# Patient Record
Sex: FEMALE | Race: WHITE | Employment: FULL TIME | URBAN - METROPOLITAN AREA
[De-identification: names, ages, dates, MRNs, and addresses within clinical notes are randomized per-mention and may not be internally consistent; named-entity substitution may affect disease eponyms.]

---

## 2021-09-10 ENCOUNTER — TELEPHONE (OUTPATIENT)
Dept: BARIATRICS/WEIGHT MGMT | Age: 51
End: 2021-09-10

## 2021-09-10 NOTE — TELEPHONE ENCOUNTER
Patient was sent Dr. Brian Yo digital bariatric seminar. Patient DOES NOT have BWLS coverage with [employer] (Plan Exclusion)     Bariatric Benefits scanned in media. *Spoke with pt. Info given. She would like to proceed as SELF PAY at this time. She will be on her 's insurance Jan 2022, in hopes to have surgical coverage. SELF PAY info explained & e-mailed. Appt sched. Np pk emailed. Per pt No HX of WLS.  BMI > 35

## 2021-11-03 ENCOUNTER — OFFICE VISIT (OUTPATIENT)
Dept: BARIATRICS/WEIGHT MGMT | Age: 51
End: 2021-11-03

## 2021-11-03 VITALS
BODY MASS INDEX: 44.84 KG/M2 | HEART RATE: 74 BPM | HEIGHT: 66 IN | WEIGHT: 279 LBS | DIASTOLIC BLOOD PRESSURE: 80 MMHG | SYSTOLIC BLOOD PRESSURE: 119 MMHG

## 2021-11-03 DIAGNOSIS — Z01.818 PRE-OPERATIVE CLEARANCE: ICD-10-CM

## 2021-11-03 DIAGNOSIS — E66.01 MORBID OBESITY WITH BMI OF 45.0-49.9, ADULT (HCC): Primary | ICD-10-CM

## 2021-11-03 PROCEDURE — 99204 OFFICE O/P NEW MOD 45 MIN: CPT | Performed by: SURGERY

## 2021-11-03 RX ORDER — SERTRALINE HYDROCHLORIDE 100 MG/1
100 TABLET, FILM COATED ORAL NIGHTLY
COMMUNITY
Start: 2021-10-07

## 2021-11-03 NOTE — PROGRESS NOTES
[facility]) Physicians   General & Laparoscopic Surgery  [facility]      HPI:    Marie Chapa is a very pleasant 54 y.o. obese female ,   Body mass index is 45.03 kg/m². And multiple medical problems who is presenting for weight loss surgery evaluation and consultation     Patient has been struggling for several years now with obesity. Patient feels the weight is an obstacle to achieve and perform things in daily living as well risk on health. Tries to diet, and exercise but can't keep the weight off. Patient tried other regimens, but with no sustainable weight loss. Patient  is very determined to lose weight and be healthy, and is interested in surgical weight loss to achieve this goal.    Otherwise patient denies any nausea, vomiting, fevers, chills, shortness of breath, chest pain, constipation or urinary symptoms. Pain Assessment   Denies any abdominal pain     Past Medical History:   Diagnosis Date    Cancer ([address]     skin    Depression     Migraine     Pre-operative clearance      Past Surgical History:   Procedure Laterality Date    BREAST ENHANCEMENT SURGERY      KNEE SURGERY      SKIN TAG REMOVAL      cancer     Family History   Problem Relation Age of Onset    Breast Cancer Mother     Cancer Father         pancreatic    Cancer Maternal Grandmother         lung     Social History     Tobacco Use    Smoking status: Never Smoker    Smokeless tobacco: Never Used   Substance Use Topics    Alcohol use: Not Currently     I counseled the patient on the importance of not smoking and risks of ETOH. Allergies   Allergen Reactions    Prochlorperazine Hives and Other (See Comments)     Vitals:    11/03/21 0929   BP: 119/80   Pulse: 74   Weight: 279 lb (126.6 kg)   Height: 5' 6\" (1.676 m)       Body mass index is 45.03 kg/m².       Current Outpatient Medications:     sertraline (ZOLOFT) 100 MG tablet, Take 100 mg by mouth 2 times daily, Disp: , Rfl:    Acetaminophen (TYLENOL) 325 MG CAPS, Take 50 mg by mouth nightly as needed, Disp: , Rfl:     levonorgestrel (MIRENA, 52 MG,) IUD 52 mg, 1 each by IntraUTERine route once Permanent birth control Implant, Disp: , Rfl:       Review of Systems - History obtained from the patient  General ROS: negative  Psychological ROS: negative  Ophthalmic ROS: negative  Neurological ROS: negative  ENT ROS: negative  Allergy and Immunology ROS: negative  Hematological and Lymphatic ROS: negative  Endocrine ROS: negative  Respiratory ROS: negative  Cardiovascular ROS: negative  Gastrointestinal ROS:negative  Genito-Urinary ROS: negative  Musculoskeletal ROS: negative   Skin ROS: negative    Physical Exam   Constitutional: Patient is oriented to person, place, and time. Vital signs are normal. Patient  appears well-developed and well-nourished. Patient  is active and cooperative. Non-toxic appearance. No distress. HENT:   Head: Normocephalic and atraumatic. Head is without laceration. Right Ear: External ear normal. No lacerations. No drainage, swelling or tenderness. Left Ear: External ear normal. No lacerations. No drainage, swelling or tenderness. Nose: Nose normal. No nose lacerations or nasal deformity. Mouth/Throat: Uvula is midline, oropharynx is clear and moist and mucous membranes are normal. No oropharyngeal exudate. Eyes: Conjunctivae, EOM and lids are normal. Pupils are equal, round, and reactive to light. Right eye exhibits no discharge. No foreign body present in the right eye. Left eye exhibits no discharge. No foreign body present in the left eye. No scleral icterus. Neck: Trachea normal and normal range of motion. Neck supple. No JVD present. No tracheal tenderness present. Carotid bruit is not present. No rigidity. No tracheal deviation and no edema present. No thyromegaly present. Cardiovascular: Normal rate, regular rhythm, normal heart sounds, intact distal pulses and normal pulses. Pulmonary/Chest: Effort normal and breath sounds normal. No stridor. No respiratory distress. Patient  has no wheezes. Patient has no rales. Patient exhibits no tenderness and no crepitus. Abdominal: Soft. Normal appearance and bowel sounds are normal. Patient exhibits no distension, no abdominal bruit, no ascites and no mass. There is no hepatosplenomegaly. There is no tenderness. There is no rigidity, no rebound, no guarding and no CVA tenderness. No hernia. Hernia confirmed negative in the ventral area. Musculoskeletal: Normal range of motion. Patient exhibits no edema or tenderness. Lymphadenopathy:        Head (right side): No submental, no submandibular, no preauricular, no posterior auricular and no occipital adenopathy present. Head (left side): No submental, no submandibular, no preauricular, no posterior auricular and no occipital adenopathy present. Patient  has no cervical adenopathy. Right: No supraclavicular adenopathy present. Left: No supraclavicular adenopathy present. Neurological: Patient is alert and oriented to person, place, and time. Patient has normal strength. Coordination and gait normal. GCS eye subscore is 4. GCS verbal subscore is 5. GCS motor subscore is 6. Skin: Skin is warm and dry. No abrasion and no rash noted. Patient  is not diaphoretic. No cyanosis or erythema. Psychiatric: Patient has a normal mood and affect. speech is normal and behavior is normal. Cognition and memory are normal.             A/P  Kristen Carroll is a very pleasant 54 y.o. female with Obesity,  Body mass index is 45.03 kg/m². and multiple obesity related co-morbidities. Kristen Carroll is very motivated to lose weight and being more healthy. We discussed how her weight affects her overall health including:  Ashley Robbins was seen today for bariatric, initial visit.     Diagnoses and all orders for this visit:    Morbid obesity with BMI of 45.0-49.9, adult ([facility] [address]  - CBC Auto Differential; Future  -     Comprehensive Metabolic Panel; Future  -     Hemoglobin A1C; Future  -     Iron and TIBC; Future  -     Lipid Panel; Future  -     TSH with Reflex; Future  -     Vitamin B12 & Folate; Future  -     Vitamin D 25 Hydroxy; Future    Pre-operative clearance  -     CBC Auto Differential; Future  -     Comprehensive Metabolic Panel; Future  -     Hemoglobin A1C; Future  -     Iron and TIBC; Future  -     Lipid Panel; Future  -     TSH with Reflex; Future  -     Vitamin B12 & Folate; Future  -     Vitamin D 25 Hydroxy; Future      The patient underwent 30 minutes of dietary counseling. I have reviewed, discussed and agree with the dietary plan. Medical weight loss and different surgical options were discussed in details with patient. Denita Kayser is interested in surgical weight loss. Patient is interested in Laparoscopic Sleeve Gastrectomy, which I believe is an excellent option for the patient. We will proceed with pre-operative work up labs and studies. Will also petition patient's  insurance for approval for this procedure. Patient received dietary handouts and education. Patient advised that its their responsibility to follow up for studies and/or labs ordered today. Also discussed in details the importance of follow up, as well following the recommendations and completing the whole program to improve outcomes when it comes to healthier lifestyle as well weight loss. Patient also advised about risks and benefits being on a strict dietary regimen as well using supplements. Patient agrees and wants to proceed with weight loss planning     Labs ordered. Psych Evaluation. H-pylori   EGD  Support Group. PCP Letter. Weight History    F/U in 4 weeks. Patient advised that its their responsibility to follow up for studies and/or labs ordered today.      Total encounter time: 45 min, including any number of the following: review of labs, imaging, provider notes, outside hospital records; performing examination/evaluation; counseling patient and family; ordering medications/tests; placing referrals and communication with referring physicians; coordination of care, and documentation in the EHR.

## 2021-11-03 NOTE — PROGRESS NOTES
Lizzie Macedo is a 54 y.o. female with a date of birth of 1970. Vitals:    11/03/21 0929   BP: 119/80   Pulse: 74    BMI: Body mass index is 45.03 kg/m². Obesity Classification: Class III    Weight History: Wt Readings from Last 3 Encounters:   11/03/21 279 lb (126.6 kg)       Patient's lowest adult weight was 185 lbs at age 46. Patient's highest adult weight was 280 lbs at age 54. Patient has participated in the following weight loss programs: Atkins Diet, [employer], [address] [place] [address], [facility] [employer], low fat, diet workshop, calorie restriction, low carb and physician supervised diet. Patient has participated in meal replacement/liquid diets - Optifast many years ago. Patient has participated in weight loss medications - fenphen, Adipex 2 years ago. Patient is not lactose intolerant. Patient does not have Mormon/cultural food concerns. Patient does not have food allergies. Patient does tolerate artificial sweeteners. Patient does have a regular sleep/wake schedule; she struggles to fall asleep but sleeps well  24 hour recall/food frequency chart:  Breakfast: yes. Mini turkey and cheese sandwich, can Pepsi  Snack: no.   Lunch: yes. Fast food - [employer] chix nugget meal or fish sandwich meal, ff, soda  Snack: no.   Dinner: yes. Protein, potatoes, veggies, bread with butter, Coke  Snack: no.   Drinks throughout the day: Coke/Pepsi, very little water  Do you drink alcohol? No.     Patient does not meet the criteria for binge eating disorder. Patient does not have grazing. Patient does not have night eating. Patient does have a history of emotional eating or eating out of boredom. Surgery  Patient does feel confident in her ability to make these changes. The patient's expectations of post-surgical eating habits are realistic. Patient states she does understand the consequences of not complying with post-op food guidelines.   Patient states she does understands the long term changes in food intake that will be necessary for all occasions after surgery for the rest of her life. Patient is deemed nutritionally appropriate to proceed. Goals  Weight: under 20lb  Health Improvement: improve mobility and energy level, avoid weight related illnesses    Assessment  Nutritional Needs: RMR=(9.99 x 127) + (6.25 x 168) - (4.92 x 54 y.o.) -161  = 1917 kcal x 1.4 (sedentary activity factor)= 2684 kcal - 1000 (for 2 lb weight loss/week)= 1684 kcal.    Plan  Plan/Recommendations: Start presurgical guidelines. Goals:   -Eat 4-5 times daily  -Avoid high fat and high sugar foods  -Include protein with all meals and snacks  -Avoid carbonation and caffeine  -Avoid calorie containing beverages  -Increase physical activity as tolerated    PES Statement:  Overweight/Obesity related to lack of exercise, sedentary lifestyle, unhealthy eating habits, and unsuccessful diet attempts as evidenced by BMI. Body mass index is 45.03 kg/m². Will follow up as necessary.     Jorge Waller RD, LD

## 2021-12-16 ENCOUNTER — OFFICE VISIT (OUTPATIENT)
Dept: BARIATRICS/WEIGHT MGMT | Age: 51
End: 2021-12-16

## 2021-12-16 VITALS — BODY MASS INDEX: 45.48 KG/M2 | WEIGHT: 283 LBS | HEIGHT: 66 IN

## 2021-12-16 DIAGNOSIS — E78.5 HYPERLIPIDEMIA, UNSPECIFIED HYPERLIPIDEMIA TYPE: ICD-10-CM

## 2021-12-16 DIAGNOSIS — E66.01 MORBID OBESITY WITH BMI OF 45.0-49.9, ADULT (HCC): Primary | ICD-10-CM

## 2021-12-16 PROCEDURE — 99214 OFFICE O/P EST MOD 30 MIN: CPT | Performed by: SURGERY

## 2021-12-16 NOTE — PROGRESS NOTES
[facility]   General & Laparoscopic Surgery  [facility]       HPI:     Ayush Good is a very pleasant 54 y.o. female with Body mass index is 45.68 kg/m². , Pre-Surgery. Pre-operative clearance and work up pending. Working hard to keep good dietary habits as well level of activity. Patient denies any nausea, vomiting, fevers, chills, shortness of breath, chest pain, cough, constipation or difficulty urinating. Past Medical History:   Diagnosis Date    Cancer ([address]     skin    Depression     Migraine     Pre-operative clearance      Past Surgical History:   Procedure Laterality Date    BREAST ENHANCEMENT SURGERY      KNEE SURGERY      SKIN TAG REMOVAL      cancer     Family History   Problem Relation Age of Onset    Breast Cancer Mother     Cancer Father         pancreatic    Cancer Maternal Grandmother         lung     Social History     Tobacco Use    Smoking status: Never Smoker    Smokeless tobacco: Never Used   Substance Use Topics    Alcohol use: Not Currently     I counseled the patient on the importance of not smoking and risks of ETOH. Allergies   Allergen Reactions    Prochlorperazine Hives and Other (See Comments)     Vitals:    12/16/21 0808   Weight: 283 lb (128.4 kg)   Height: 5' 6\" (1.676 m)       Body mass index is 45.68 kg/m².       Current Outpatient Medications:     sertraline (ZOLOFT) 100 MG tablet, Take 100 mg by mouth 2 times daily, Disp: , Rfl:     Acetaminophen (TYLENOL) 325 MG CAPS, Take 50 mg by mouth nightly as needed, Disp: , Rfl:     levonorgestrel (MIRENA, 52 MG,) IUD 52 mg, 1 each by IntraUTERine route once Permanent birth control Implant, Disp: , Rfl:       Review of Systems - History obtained from the patient  General ROS: negative  Psychological ROS: negative  Endocrine ROS: negative  Respiratory ROS: negative  Cardiovascular ROS: negative  Gastrointestinal ROS:negative  Genito-Urinary ROS: negative  Musculoskeletal ROS: negative   Skin ROS: negative    Physical Exam   Vitals Reviewed   Constitutional: Patient is oriented to person, place, and time. Patient appears well-developed and well-nourished. Patient is active and cooperative. Non-toxic appearance. No distress. Neck: Trachea normal and normal range of motion. No JVD present. Pulmonary/Chest: Effort normal. No accessory muscle usage or stridor. No apnea. No respiratory distress. Cardiovascular: Normal rate and no JVD. Abdominal: Normal appearance. Patient exhibits no distension. Abdomen is soft, obese, non tender. Musculoskeletal: Normal range of motion. Patient exhibits no edema. Neurological: Patient is alert and oriented to person, place, and time. Patient has normal strength. GCS eye subscore is 4. GCS verbal subscore is 5. GCS motor subscore is 6. Skin: Skin is warm and dry. No abrasion and no rash noted. Patient is not diaphoretic. No cyanosis or erythema. Psychiatric: Patient has a normal mood and affect. Speech is normal and behavior is normal. Cognition and memory are normal.       A/P    Libby Newell is 54 y.o. female, Body mass index is 45.68 kg/m². pre surgery, has gained 4# since last visit. The patient underwent dietary counseling with registered dietician. I have reviewed, discussed and agree with the dietary plan. Patient is trying hard to keep good dietary and behavior modifications. Patient is monitoring portion sizes, food choices and liquid calories. Patient is trying to exercise regularly as much as possible. We discussed how her weight affects her overall health including:  Galo Bell was seen today for obesity. Diagnoses and all orders for this visit:    Morbid obesity with BMI of 45.0-49.9, adult ([address]    Hyperlipidemia, unspecified hyperlipidemia type       and importance of weight loss to alleviate those co morbid conditions. I encouraged the patient to continue exercise and keeping healthy eating habits.   Discussed pre-op labs and work up till now. Also counseled the patient extensively on Surgery. Total encounter time: 30 minutes including any number of the following: review of labs, imaging, provider notes, outside hospital records; performing examination/evaluation; counseling patient and family; ordering medications/tests; placing referrals and communication with referring physicians; coordination of care, and documentation in the EHR. RTC in 4 weeks  Obtain rest of pre-op work up / clearances  Diet and Exercise   Counseled on hyperlipidemia and encouraged f/u with PCP to start medications  Discussed fatty liver and elevated LFT's     Patient advised that its their responsibility to follow up for studies and/or labs ordered today.      Rodrick Mc

## 2021-12-16 NOTE — PROGRESS NOTES
Maria Teresa Meza gained 4 lbs over past 6 weeks. Breakfast: turkey sandwich with smaller amount of LF cheese on slider bun with FF burris     Snack: granola bar OR fruit     Lunch: fast food - chicken mcnugget and coke     Snack: nothing     Dinner: Deon Burgos with her in-laws (they cook) - tries to do smaller portions     Snack: nothing     Is pt consuming smaller portions? Yes     Is pt consuming at least 64 oz of fluids per day? Not just water - 48 oz of water     Is pt consuming carbonated, caffeinated, or sugary beverages? Yes - reduced coke and pepsi to 2 per day (was 4), drinking more water     Has pt sampled Unjury and/or Nectar protein? Not yet, discussed today     Has patient attended a support group? Completed    Exercise: Limited.  Has a treadmill, but not really using     Plan/Recommendations:   Try protein powder   Avoid soda   Include protein based snacks   Meal plan and prep and avoid fast food   Increase intentional exercise   Discussed ways to navigate dinner time with family who prepares meals for her     Handouts: presurgical diet eating guide and protein bar handout     Paty Oakley RD, LD

## 2021-12-28 ENCOUNTER — TELEPHONE (OUTPATIENT)
Dept: SURGERY | Age: 51
End: 2021-12-28

## 2021-12-28 NOTE — TELEPHONE ENCOUNTER
Patient has been scheduled for:    Procedure: Combo case with Portia Thomas EGD/Colon  Date: 2/14/22  Time: 9:45AM  Arrival: 7:45AM  [facility]  ASA?: N/A  Prep? Colon, emailed to patient     Pre-op? N/A    Post-op Appt? N/A    Patient advised they will need a . Orders faxed to surgery scheduling. Instructions have been emailed to: [email or web address]

## 2022-01-04 ENCOUNTER — INITIAL CONSULT (OUTPATIENT)
Dept: BARIATRICS/WEIGHT MGMT | Age: 52
End: 2022-01-04

## 2022-01-04 DIAGNOSIS — F33.0 MDD (MAJOR DEPRESSIVE DISORDER), RECURRENT EPISODE, MILD (HCC): Primary | ICD-10-CM

## 2022-01-04 DIAGNOSIS — E66.01 MORBID OBESITY WITH BMI OF 45.0-49.9, ADULT (HCC): ICD-10-CM

## 2022-01-04 PROCEDURE — 99999 PR OFFICE/OUTPT VISIT,PROCEDURE ONLY: CPT | Performed by: PSYCHOLOGIST

## 2022-01-04 NOTE — PROGRESS NOTES
Josse Santos presented for her presurgical psychological evaluation on 01/04/2022. The evaluation consisted of a clinical interview, the Eating Habits Checklist, the Binge Eating Disorder Screener - 7 (BEDS-7), and the Adama  (MBMD) administered by the provider. Based on the evaluation, Josse Santos is considered to be an appropriate candidate for bariatric surgery from a psychological standpoint. She acknowledges a history of mild, recurrent depression for which she is currently taking Zoloft 100mg bid and lamotrigine 100mg prescribed by her family physician. She states the medications are effective in ameliorating her symptoms. She has participated in psychotherapy intermittently over the years, with her most recent period of treatment being over 10 years ago. She denies a history of suicidal ideation or suicide attempts, and has never been hospitalized psychiatrically. There is no indication of chemical abuse or dependence. She is a non-smoker. She reports drinking little to no alcohol, and denies any other recreational or illicit drug use. She denies a history of trauma. Josse Santos has never been diagnosed with an eating disorder, and her responses in the interview and on the Eating Habits Checklist and the BEDS-7 do not warrant a clinical diagnosis. She does, however, acknowledge eating in response to emotional stress and boredom on occasion. She reports eating 4-5 small meals and/or snacks consistently throughout her day. She is actively weaning herself from caffeine and carbonation, noting she is down to one 20oz bottle of Pepsi per week from three bottles per day in the past. She reports drinking at leats 48 oz of water per day, and indicated she is working to increase her daily intake. She endorsed self-punitive thoughts and feelings related to her weight and/or eating behaviors. She denies binge eating or purging behavior.  Josse Santos maintains a high level of functional activity working as a  for [employer], with whom she has been employed for five years. She currently resides with her  of seven years. Silvana Ortega completed the MBMD as part of the evaluation. Her profile is valid. Results indicate eating is a possible problem area, and inactivity is a likely problem area at this time. There is no indication of acute psychiatric distress, including anxiety, depression, emotional lability, or cognitive dysfunction. She does, however, endorse significantly more interpersonal guardedness than the typical medical patient, which could adversely affect her willingness and/or ability to establish rapport with her providers and follow medical recommendations. Her profile is characterized by a public posture of assertiveness and a tendency to maintain a cool distance from others. Her guardedness is, in large part, likely due to her own insecurities and the perceived risk of rejection or humiliation by others. Because she is accustomed to feeling strong and unassailable, she may struggle with the vulnerable role of medical patient. Her initial response to illness is likely to be denial, followed by annoyance or blaming as her level of discomfort or debilitation increases. Providers should adopt a straightforward and businesslike approach in order to win her respect and cooperation. Allowing her to participate as much as possible in the planning and implementation of her treatment plan should also enhance compliance by drawing on her desire for self-mastery and control. Her profile indicates she may be prone to over-utilize healthcare resources. The primary coping asset reflected in her profile is her openness to receiving feedback and/or discussing matters pertaining to her health.      Silvana Ortega exhibited good awareness of the risks of bariatric surgery; however, she believes the benefits will outweigh the potential risks, as she hopes to minimize or reverse the effects of her migraines, and avoid the development of additional weight-related medical concerns. She reports realistic expectations for the procedure, as her overall goals include improved health, increased self-confidence, and maintenance of her weight below 200 lbs. She understands the need for permanent lifestyle change, including dietary modifications and a regular exercise program, and expressed willingness to implement the necessary changes. She expressed a commitment to comply with treatment recommendations through this office. She identified her , her sister, her mother, and a good friend who has already undergone weight loss surgery as a good support system in her efforts to meet her weight management goals. In summary, Kevan Rubinstein is considered to be an appropriate candidate for bariatric surgery from a psychological standpoint. She was encouraged to participate in support group activities through [facility], and to consult with our staff and her PCP should she experience any significant post-surgical mood changes or have difficulty modifying her eating behaviors. Feel free to consult with me as needed with any further questions regarding this evaluation. Patient spent 30 minutes completing the psychological testing. Provider spent 55 minutes in test evaluation services on 01/04/2022.

## 2022-02-11 ENCOUNTER — ANESTHESIA EVENT (OUTPATIENT)
Dept: ENDOSCOPY | Age: 52
End: 2022-02-11
Payer: COMMERCIAL

## 2022-02-11 DIAGNOSIS — K21.9 GASTROESOPHAGEAL REFLUX DISEASE WITHOUT ESOPHAGITIS: ICD-10-CM

## 2022-02-11 RX ORDER — PANTOPRAZOLE SODIUM 20 MG/1
20 TABLET, DELAYED RELEASE ORAL DAILY
COMMUNITY
End: 2022-10-20 | Stop reason: ALTCHOICE

## 2022-02-14 ENCOUNTER — HOSPITAL ENCOUNTER (OUTPATIENT)
Age: 52
Setting detail: OUTPATIENT SURGERY
Discharge: HOME | End: 2022-02-14
Attending: SURGERY | Admitting: SURGERY
Payer: COMMERCIAL

## 2022-02-14 ENCOUNTER — ANESTHESIA (OUTPATIENT)
Dept: ENDOSCOPY | Age: 52
End: 2022-02-14
Payer: COMMERCIAL

## 2022-02-14 VITALS
WEIGHT: 282 LBS | OXYGEN SATURATION: 99 % | HEART RATE: 72 BPM | HEIGHT: 66 IN | SYSTOLIC BLOOD PRESSURE: 107 MMHG | BODY MASS INDEX: 45.32 KG/M2 | DIASTOLIC BLOOD PRESSURE: 69 MMHG | RESPIRATION RATE: 18 BRPM | TEMPERATURE: 97.5 F

## 2022-02-14 VITALS
SYSTOLIC BLOOD PRESSURE: 102 MMHG | OXYGEN SATURATION: 98 % | DIASTOLIC BLOOD PRESSURE: 74 MMHG | RESPIRATION RATE: 16 BRPM

## 2022-02-14 DIAGNOSIS — Z12.11 SCREEN FOR COLON CANCER: ICD-10-CM

## 2022-02-14 DIAGNOSIS — Z01.818 PRE-OPERATIVE CLEARANCE: ICD-10-CM

## 2022-02-14 PROBLEM — E66.01 MORBID OBESITY (HCC): Status: ACTIVE | Noted: 2022-02-14

## 2022-02-14 LAB — PREGNANCY, URINE: NEGATIVE

## 2022-02-14 PROCEDURE — 3700000001 HC ADD 15 MINUTES (ANESTHESIA): Performed by: SURGERY

## 2022-02-14 PROCEDURE — 2580000003 HC RX 258: Performed by: ANESTHESIOLOGY

## 2022-02-14 PROCEDURE — 3609027000 HC COLONOSCOPY: Performed by: SURGERY

## 2022-02-14 PROCEDURE — 43239 EGD BIOPSY SINGLE/MULTIPLE: CPT | Performed by: SURGERY

## 2022-02-14 PROCEDURE — 3700000000 HC ANESTHESIA ATTENDED CARE: Performed by: SURGERY

## 2022-02-14 PROCEDURE — 84703 CHORIONIC GONADOTROPIN ASSAY: CPT

## 2022-02-14 PROCEDURE — 6360000002 HC RX W HCPCS: Performed by: NURSE ANESTHETIST, CERTIFIED REGISTERED

## 2022-02-14 PROCEDURE — 3609012400 HC EGD TRANSORAL BIOPSY SINGLE/MULTIPLE: Performed by: SURGERY

## 2022-02-14 PROCEDURE — 45378 DIAGNOSTIC COLONOSCOPY: CPT | Performed by: SURGERY

## 2022-02-14 PROCEDURE — 7100000011 HC PHASE II RECOVERY - ADDTL 15 MIN: Performed by: SURGERY

## 2022-02-14 PROCEDURE — 7100000010 HC PHASE II RECOVERY - FIRST 15 MIN: Performed by: SURGERY

## 2022-02-14 PROCEDURE — 88305 TISSUE EXAM BY PATHOLOGIST: CPT

## 2022-02-14 PROCEDURE — 2500000003 HC RX 250 WO HCPCS: Performed by: NURSE ANESTHETIST, CERTIFIED REGISTERED

## 2022-02-14 PROCEDURE — 2709999900 HC NON-CHARGEABLE SUPPLY: Performed by: SURGERY

## 2022-02-14 RX ORDER — SODIUM CHLORIDE, SODIUM LACTATE, POTASSIUM CHLORIDE, CALCIUM CHLORIDE 600; 310; 30; 20 MG/100ML; MG/100ML; MG/100ML; MG/100ML
INJECTION, SOLUTION INTRAVENOUS CONTINUOUS
Status: DISCONTINUED | OUTPATIENT
Start: 2022-02-14 | End: 2022-02-14 | Stop reason: HOSPADM

## 2022-02-14 RX ORDER — LIDOCAINE HYDROCHLORIDE 20 MG/ML
INJECTION, SOLUTION EPIDURAL; INFILTRATION; INTRACAUDAL; PERINEURAL PRN
Status: DISCONTINUED | OUTPATIENT
Start: 2022-02-14 | End: 2022-02-14 | Stop reason: SDUPTHER

## 2022-02-14 RX ORDER — SODIUM CHLORIDE 0.9 % (FLUSH) 0.9 %
5-40 SYRINGE (ML) INJECTION EVERY 12 HOURS SCHEDULED
Status: DISCONTINUED | OUTPATIENT
Start: 2022-02-14 | End: 2022-02-14 | Stop reason: HOSPADM

## 2022-02-14 RX ORDER — PROPOFOL 10 MG/ML
INJECTION, EMULSION INTRAVENOUS CONTINUOUS PRN
Status: DISCONTINUED | OUTPATIENT
Start: 2022-02-14 | End: 2022-02-14 | Stop reason: SDUPTHER

## 2022-02-14 RX ORDER — PROPOFOL 10 MG/ML
INJECTION, EMULSION INTRAVENOUS PRN
Status: DISCONTINUED | OUTPATIENT
Start: 2022-02-14 | End: 2022-02-14 | Stop reason: SDUPTHER

## 2022-02-14 RX ORDER — SODIUM CHLORIDE 9 MG/ML
25 INJECTION, SOLUTION INTRAVENOUS PRN
Status: DISCONTINUED | OUTPATIENT
Start: 2022-02-14 | End: 2022-02-14 | Stop reason: HOSPADM

## 2022-02-14 RX ORDER — LAMOTRIGINE 150 MG/1
TABLET ORAL NIGHTLY
COMMUNITY
Start: 2022-02-09

## 2022-02-14 RX ORDER — GLYCOPYRROLATE 0.2 MG/ML
INJECTION INTRAMUSCULAR; INTRAVENOUS PRN
Status: DISCONTINUED | OUTPATIENT
Start: 2022-02-14 | End: 2022-02-14 | Stop reason: SDUPTHER

## 2022-02-14 RX ORDER — SODIUM CHLORIDE 0.9 % (FLUSH) 0.9 %
5-40 SYRINGE (ML) INJECTION PRN
Status: DISCONTINUED | OUTPATIENT
Start: 2022-02-14 | End: 2022-02-14 | Stop reason: HOSPADM

## 2022-02-14 RX ADMIN — LIDOCAINE HYDROCHLORIDE 100 MG: 20 INJECTION, SOLUTION EPIDURAL; INFILTRATION; INTRACAUDAL; PERINEURAL at 09:23

## 2022-02-14 RX ADMIN — SODIUM CHLORIDE, POTASSIUM CHLORIDE, SODIUM LACTATE AND CALCIUM CHLORIDE: 600; 310; 30; 20 INJECTION, SOLUTION INTRAVENOUS at 09:16

## 2022-02-14 RX ADMIN — PROPOFOL 100 MG: 10 INJECTION, EMULSION INTRAVENOUS at 09:24

## 2022-02-14 RX ADMIN — PROPOFOL 50 MG: 10 INJECTION, EMULSION INTRAVENOUS at 09:27

## 2022-02-14 RX ADMIN — GLYCOPYRROLATE 0.1 MG: 0.2 INJECTION INTRAMUSCULAR; INTRAVENOUS at 09:23

## 2022-02-14 RX ADMIN — SODIUM CHLORIDE, POTASSIUM CHLORIDE, SODIUM LACTATE AND CALCIUM CHLORIDE: 600; 310; 30; 20 INJECTION, SOLUTION INTRAVENOUS at 07:53

## 2022-02-14 RX ADMIN — PROPOFOL 125 MCG/KG/MIN: 10 INJECTION, EMULSION INTRAVENOUS at 09:32

## 2022-02-14 RX ADMIN — PROPOFOL 50 MG: 10 INJECTION, EMULSION INTRAVENOUS at 09:30

## 2022-02-14 ASSESSMENT — PULMONARY FUNCTION TESTS
PIF_VALUE: 1

## 2022-02-14 ASSESSMENT — PAIN - FUNCTIONAL ASSESSMENT
PAIN_FUNCTIONAL_ASSESSMENT: 0-10
PAIN_FUNCTIONAL_ASSESSMENT: 0-10
PAIN_FUNCTIONAL_ASSESSMENT: PREVENTS OR INTERFERES SOME ACTIVE ACTIVITIES AND ADLS
PAIN_FUNCTIONAL_ASSESSMENT: PREVENTS OR INTERFERES SOME ACTIVE ACTIVITIES AND ADLS
PAIN_FUNCTIONAL_ASSESSMENT: 0-10
PAIN_FUNCTIONAL_ASSESSMENT: 0-10

## 2022-02-14 ASSESSMENT — PAIN DESCRIPTION - DESCRIPTORS
DESCRIPTORS: HEADACHE
DESCRIPTORS: HEADACHE

## 2022-02-14 ASSESSMENT — LIFESTYLE VARIABLES: SMOKING_STATUS: 0

## 2022-02-14 ASSESSMENT — PAIN SCALES - GENERAL: PAINLEVEL_OUTOF10: 0

## 2022-02-14 NOTE — ANESTHESIA POSTPROCEDURE EVALUATION
Department of Anesthesiology  Postprocedure Note    Patient: Rio Hilario  MRN: [id number]  YOB: 1970  Date of evaluation: 2/14/2022  Time:  12:14 PM     Procedure Summary     Date: 02/14/22 Room / Location: [address] [place] [address] Carmel Kimbrough  / [facility]    Anesthesia Start: 6404 Anesthesia Stop: 1867    Procedures:       EGD BIOPSY (N/A )      COLONOSCOPY (N/A ) Diagnosis:       Pre-operative clearance      Screen for colon cancer      (Pre-operative clearance [Z01.818], SCREENING FOR COLON CANCER [Z12.11])    Surgeons: Aleksander Pitts DO; Rina Qiu MD Responsible Provider: Jose C Graham MD    Anesthesia Type: MAC ASA Status: 3          Anesthesia Type: MAC    Logan Phase I: Logan Score: 10    Logan Phase II: Logan Score: 9    Last vitals: Reviewed and per EMR flowsheets.        Anesthesia Post Evaluation    Patient location during evaluation: bedside  Level of consciousness: awake and alert  Airway patency: patent  Nausea & Vomiting: no vomiting  Complications: no  Cardiovascular status: blood pressure returned to baseline  Respiratory status: acceptable  Hydration status: euvolemic

## 2022-02-14 NOTE — OP NOTE
Operative Note      Patient: Rohan Fagan  YOB: 1970  MRN: [id number]    Date of Procedure: 2/14/2022    Pre-Op Diagnosis: Pre-operative clearance [Z01.818], SCREENING FOR COLON CANCER [Z12.11]    Post-Op Diagnosis: SAME       Procedure(s):  EGD BIOPSY  COLONOSCOPY    Surgeon(s): DO Rosa Tineo MD    Assistant:   Leesa Gonsales DO PG3    Anesthesia: Monitor Anesthesia Care    Estimated Blood Loss (mL): 0    OPERATIVE NOTE    PREOPERATIVE DIAGNOSIS: Screening Colonoscopy - Colon Cancer Screening  POSTOPERATIVE DIAGNOSIS: Same  PROCEDURE: Colonoscopy  ANESTHESIA: MAC  SURGEONS: Tonie Arce M.D. PREP QUALITY: Good  ESTIMATED BLOOD LOSS: 0    OPERATIVE NOTE    After informed consent was obtained the patient was taken to the endoscopy suite. Time out was called to confirm key components. We began by performing a digital rectal exam: No lesions were noted on digital exam.     We then performed colonoscopy complete to the cecum. The exam was not difficult. IC valve and appendiceal orifice were seen and photographed. Terminal ileum was intubated and photographed. Withdrawal time was over 6 minutes. No  pathology was seen. Good hemostasis was seen. Retroflexion showed normal rectum and anus aside from small hemorrhoids. Dr. Socorro Mendieta was present and performed all portions. The patient tolerated well with no immediate complication. Follow up is recommend in five - ten years given family history of precancerous polyps in her father at around age 48.     Tonie Arce M.D.  2/14/22   9:59 AM

## 2022-02-14 NOTE — H&P
General Surgery   Resident History and Physical       Chief Complaint: Morbid Obesity    History of Present Illness:    Veto Francisco is a 54 y.o. female with Hx of morbid obesity, HLD, who presents today for EGD in the setting of pre-operative workup for planned weight-loss surgery. She reports occasional baseline reflux symptoms. She takes 20 mg Protonix once weekly when she has reflux symtoms. She denies chronic abdominal pain. Reports no history of colon cancer in the family, just polyps in her father. Denies changes to her stool including hematochezia/melena or change in size. No previous abdominal surgeries. Past Medical History:        Diagnosis Date    Cancer ([facility] [address]     skin    Depression     Migraine     Pre-operative clearance        Past Surgical History:        Procedure Laterality Date    BREAST ENHANCEMENT SURGERY      KNEE SURGERY      SKIN TAG REMOVAL      cancer       Allergies:    Prochlorperazine    Medications:   Home Meds  No current facility-administered medications on file prior to encounter. Current Outpatient Medications on File Prior to Encounter   Medication Sig Dispense Refill    lamoTRIgine (LAMICTAL) 150 MG tablet       sertraline (ZOLOFT) 100 MG tablet Take 100 mg by mouth 2 times daily      Acetaminophen (TYLENOL) 325 MG CAPS Take 50 mg by mouth nightly as needed         Current Meds  lactated ringers infusion, Continuous  lactated ringers infusion, Continuous  sodium chloride flush 0.9 % injection 5-40 mL, 2 times per day  sodium chloride flush 0.9 % injection 5-40 mL, PRN  0.9 % sodium chloride infusion, PRN        Family History:   Family History   Problem Relation Age of Onset    Breast Cancer Mother     Cancer Father         pancreatic    Cancer Maternal Grandmother         lung       Social History:   TOBACCO:   reports that she has never smoked. She has never used smokeless tobacco.  ETOH:   reports previous alcohol use.   DRUGS:   reports no history of drug use. Review of Systems:      Constitutional: Negative. HENT: Negative. Eyes: Negative. Respiratory: Negative. Cardiovascular: Negative. Gastrointestinal: As above  Genitourinary: Negative. Musculoskeletal: Negative. Skin: Negative. Endocrine: Negative. Allergic/Immunologic: Negative. Neurological: Negative. Hematological: Negative. Psychiatric/Behavioral: Negative. Physical exam:    Vitals:    02/14/22 0724   BP: 109/67   Pulse: 64   Resp: 18   Temp: 97.3 °F (36.3 °C)   TempSrc: Temporal   SpO2: 97%   Weight: 282 lb (127.9 kg)   Height: 5' 6\" (1.676 m)       General appearance: alert, no acute distress, grooming appropriate  Eyes: PERRLA, no scleral icterus  Neck: trachea midline, no JVD  Chest/Lungs: normal effort, no adventitious breath sounds, on RA  Cardiovascular: RRR, no murmurs/gallops/rubs  Abdomen: soft, obese, non-tender, non-distended, no hernia appreciated   Skin: warm and dry, no rashes  Extremities: no edema, no cyanosis  Neuro: A&Ox3, no focal deficits, sensation intact    Labs:    CBC: No results for input(s): WBC, HGB, HCT, MCV, PLT in the last 72 hours. BMP: No results for input(s): NA, K, CL, CO2, PHOS, BUN, CREATININE, CA in the last 72 hours. PT/INR: No results for input(s): PROTIME, INR in the last 72 hours. APTT: No results for input(s): APTT in the last 72 hours. Liver Profile: No results found for: AST, ALT, ALB, BILIDIR, BILITOT, ALKPHOS, GGT, 5NUCNo results found for: CHOL, HDL, TRIG  UA: No results found for: NITRITE, COLORU, PHUR, LABCAST, WBCUA, RBCUA, MUCUS, TRICHOMONAS, YEAST, BACTERIA, CLARITYU, SPECGRAV, LEUKOCYTESUR, UROBILINOGEN, BILIRUBINUR, BLOODU, GLUCOSEU, AMORPHOUS    Imaging:   No orders to display       Assessment/Plan: This is a 54 y.o. female with Hx of morbid obesity who presents for screening colonoscopy in the setting of planned EGD for pre-op workup for weight loss surgery.     Plan: The risks, benefits, expected outcome, and alternative to the recommended procedure have been discussed with the patient. Patient understands and wants to proceed with the procedure.      Kajal Hamlin DO  02/14/22  8:31 AM

## 2022-02-14 NOTE — ANESTHESIA PRE PROCEDURE
Department of Anesthesiology  Preprocedure Note       Name:  Steve Cruz   Age:  54 y.o.  :  1970                                          MRN:  [id number]         Date:  2022      Surgeon: Mónica Sheets): Krista Michael MD    Procedure: Procedure(s):  ESOPHAGOGASTRODUODENOSCOPY  COLONOSCOPY, POSSIBLE POLYPECTOMY    Medications prior to admission:   Prior to Admission medications    Medication Sig Start Date End Date Taking? Authorizing Provider   pantoprazole (PROTONIX) 20 MG tablet Take 20 mg by mouth daily    Historical Provider, MD   sertraline (ZOLOFT) 100 MG tablet Take 100 mg by mouth 2 times daily 10/7/21   Historical Provider, MD   Acetaminophen (TYLENOL) 325 MG CAPS Take 50 mg by mouth nightly as needed    Historical Provider, MD   levonorgestrel (MIRENA, 52 MG,) IUD 52 mg 1 each by IntraUTERine route once Permanent birth control Implant    Historical Provider, MD       Current medications:    No current facility-administered medications for this encounter. Allergies:     Allergies   Allergen Reactions    Prochlorperazine Hives and Other (See Comments)       Problem List:    Patient Active Problem List   Diagnosis Code    Gastroesophageal reflux disease without esophagitis K21.9       Past Medical History:        Diagnosis Date    Cancer ([address]     skin    Depression     Migraine     Pre-operative clearance        Past Surgical History:        Procedure Laterality Date    BREAST ENHANCEMENT SURGERY      KNEE SURGERY      SKIN TAG REMOVAL      cancer       Social History:    Social History     Tobacco Use    Smoking status: Never Smoker    Smokeless tobacco: Never Used   Substance Use Topics    Alcohol use: Not Currently                                Counseling given: Not Answered      Vital Signs (Current):   Vitals:    22 0724   BP: 109/67   Pulse: 64   Resp: 18   Temp: 97.3 °F (36.3 °C)   TempSrc: Temporal   SpO2: 97%   Weight: 282 lb (127.9 kg) Height: 5' 6\" (1.676 m)                                              BP Readings from Last 3 Encounters:   02/14/22 109/67   11/03/21 119/80       NPO Status:                                                                                 BMI:   Wt Readings from Last 3 Encounters:   02/14/22 282 lb (127.9 kg)   12/16/21 283 lb (128.4 kg)   11/03/21 279 lb (126.6 kg)     Body mass index is 45.52 kg/m². CBC: No results found for: WBC, RBC, HGB, HCT, MCV, RDW, PLT    CMP: No results found for: NA, K, CL, CO2, BUN, CREATININE, GFRAA, AGRATIO, LABGLOM, GLUCOSE, GLU, PROT, CALCIUM, BILITOT, ALKPHOS, AST, ALT    POC Tests: No results for input(s): POCGLU, POCNA, POCK, POCCL, POCBUN, POCHEMO, POCHCT in the last 72 hours. Coags: No results found for: PROTIME, INR, APTT    HCG (If Applicable): No results found for: PREGTESTUR, PREGSERUM, HCG, HCGQUANT     ABGs: No results found for: PHART, PO2ART, XHN8UKF, LQB9HHJ, BEART, [id number]     Type & Screen (If Applicable):  No results found for: LABABO, LABRH    Drug/Infectious Status (If Applicable):  No results found for: HIV, HEPCAB    COVID-19 Screening (If Applicable): No results found for: COVID19        Anesthesia Evaluation    Airway: Mallampati: II  TM distance: >3 FB   Neck ROM: full  Mouth opening: > = 3 FB Dental: normal exam         Pulmonary: breath sounds clear to auscultation      (-) COPD, asthma and not a current smoker                           Cardiovascular:        (-) hypertension, past MI, CAD, CABG/stent, dysrhythmias,  angina,  CHF and orthopnea      Rhythm: regular                      Neuro/Psych:   (+) headaches: migraine headaches, psychiatric history:            GI/Hepatic/Renal:   (+) GERD:, morbid obesity     (-) hepatitis and liver disease       Endo/Other:    (+) no malignancy/cancer. (-) diabetes mellitus, hypothyroidism, hyperthyroidism, no malignancy/cancer               Abdominal:             Vascular:     - DVT and PE.       Other Findings:             Anesthesia Plan      MAC     ASA 3       Induction: intravenous. Anesthetic plan and risks discussed with patient. Plan discussed with CRNA.                   Terrell Marcus MD   2/14/2022

## 2022-02-14 NOTE — H&P
General Surgery   Resident History and Physical       Chief Complaint: Morbid Obesity    History of Present Illness:    Yadi Perkins is a 54 y.o. female with Hx of morbid obesity, HLD, who presents today for EGD in the setting of pre-operative workup for planned weight-loss surgery. She reports occasional baseline reflux symptoms. She takes 20 mg Protonix once weekly when she has reflux symtoms. She denies chronic abdominal pain. Reports no history of colon cancer in the family. Denies changes to her stool including hematochezia/melena or change in size. No previous abdominal surgeries. Past Medical History:        Diagnosis Date    Cancer ([facility] [address]     skin    Depression     Migraine     Pre-operative clearance        Past Surgical History:        Procedure Laterality Date    BREAST ENHANCEMENT SURGERY      KNEE SURGERY      SKIN TAG REMOVAL      cancer       Allergies:    Prochlorperazine    Medications:   Home Meds  No current facility-administered medications on file prior to encounter. Current Outpatient Medications on File Prior to Encounter   Medication Sig Dispense Refill    lamoTRIgine (LAMICTAL) 150 MG tablet       sertraline (ZOLOFT) 100 MG tablet Take 100 mg by mouth 2 times daily      Acetaminophen (TYLENOL) 325 MG CAPS Take 50 mg by mouth nightly as needed         Current Meds  lactated ringers infusion, Continuous  lactated ringers infusion, Continuous  sodium chloride flush 0.9 % injection 5-40 mL, 2 times per day  sodium chloride flush 0.9 % injection 5-40 mL, PRN  0.9 % sodium chloride infusion, PRN        Family History:   Family History   Problem Relation Age of Onset    Breast Cancer Mother     Cancer Father         pancreatic    Cancer Maternal Grandmother         lung       Social History:   TOBACCO:   reports that she has never smoked. She has never used smokeless tobacco.  ETOH:   reports previous alcohol use. DRUGS:   reports no history of drug use.     Review of Systems:      Constitutional: Negative. HENT: Negative. Eyes: Negative. Respiratory: Negative. Cardiovascular: Negative. Gastrointestinal: As above  Genitourinary: Negative. Musculoskeletal: Negative. Skin: Negative. Endocrine: Negative. Allergic/Immunologic: Negative. Neurological: Negative. Hematological: Negative. Psychiatric/Behavioral: Negative. Physical exam:    Vitals:    02/14/22 0724   BP: 109/67   Pulse: 64   Resp: 18   Temp: 97.3 °F (36.3 °C)   TempSrc: Temporal   SpO2: 97%   Weight: 282 lb (127.9 kg)   Height: 5' 6\" (1.676 m)       General appearance: alert, no acute distress, grooming appropriate  Eyes: PERRLA, no scleral icterus  Neck: trachea midline, no JVD  Chest/Lungs: normal effort, no adventitious breath sounds, on RA  Cardiovascular: RRR, no murmurs/gallops/rubs  Abdomen: soft, obese, non-tender, non-distended, no hernia appreciated   Skin: warm and dry, no rashes  Extremities: no edema, no cyanosis  Neuro: A&Ox3, no focal deficits, sensation intact    Labs:    CBC: No results for input(s): WBC, HGB, HCT, MCV, PLT in the last 72 hours. BMP: No results for input(s): NA, K, CL, CO2, PHOS, BUN, CREATININE, CA in the last 72 hours. PT/INR: No results for input(s): PROTIME, INR in the last 72 hours. APTT: No results for input(s): APTT in the last 72 hours. Liver Profile: No results found for: AST, ALT, ALB, BILIDIR, BILITOT, ALKPHOS, GGT, 5NUCNo results found for: CHOL, HDL, TRIG  UA: No results found for: NITRITE, COLORU, PHUR, LABCAST, WBCUA, RBCUA, MUCUS, TRICHOMONAS, YEAST, BACTERIA, CLARITYU, SPECGRAV, LEUKOCYTESUR, UROBILINOGEN, BILIRUBINUR, BLOODU, GLUCOSEU, AMORPHOUS    Imaging:   No orders to display       Assessment/Plan: This is a 54 y.o. female with Hx of morbid obesity who presents for EGD for pre-op workup in the setting of planned weight loss surgery.      Plan: The risks, benefits, expected outcome, and alternative to the recommended procedure have been discussed with the patient. Patient understands and wants to proceed with the procedure.      Devin Record, DO  02/14/22  8:23 AM

## 2022-02-14 NOTE — PROGRESS NOTES
Ambulatory Surgery/Procedure Discharge Note    Pt tolerated procedure well. Denies any nausea post procedure. Pain after procedure related to caffeine headache. Discharge instructions reviewed with pt and mother. Written instructions provided at discharge. Discharged in wheelchair to lobby level. Mother to drive home. Vitals:    02/14/22 1030   BP: 107/69   Pulse: 72   Resp: 18   Temp:    SpO2: 99%       In: 620 [P.O.:120; I.V.:500]  Out: -     Restroom use offered before discharge. Yes    Pain assessment:  level of pain (1-10, 10 severe),   Pain Level: 5        Patient discharged to home/self care.  Patient discharged via wheel chair by transporter to waiting family/S.O.       2/14/2022 10:45 AM

## 2022-02-16 NOTE — OP NOTE
Esophagogastroduodenoscopy with biopsy    Indications: Pre-op gastric Surgery, rule out Gastroesophageal reflux disease. Pre-operative Diagnosis: Obesity/pre-op bariatric surgery . Post-operative Diagnosis: Superficial Gastritis    Surgeon: Nataliya Quiroz    Anesthesia: MAC      Procedure Details   The patient was seen in the Holding Room. The risks, benefits, complications, treatment options, and expected outcomes were discussed with the patient. Pre-op Endoscopy recommended to rule any intragastric pathology that would interfere with proposed procedure /  And or due to current conditions. The possibilities of reaction to medication, pulmonary aspiration, perforation of viscus, bleeding, recurrent infection, the need for additional procedures, failure to diagnose a condition, and creating a complication requiring transfusion or operation were discussed with the patient. The patient concurred with the proposed plan, giving informed consent. The site of surgery properly noted/marked. The patient was taken to Endoscopy Suite, identified and the procedure verified as  Esophagogastroduodenoscopy  A Time Out was held and the above information confirmed. Upper Endoscopy: An endoscope was inserted through the oropharynx into the upper esophagus. The endoscope was passed into the stomach to the level of the pylorus and passed to the duodenum where the ampulla was visualized and duodenum was normal. Then the scope was retracted back to the stomach and it was normal except for gastritis, biopsy of the antrum obtained for H. Pylori, then retroflexed and the gastroesophageal junction was inspected.  There was no hiatal hernia and no evidence of Young's     Findings:  Esophageal findings include:  Upper: normal Esophageal Mucosa  Lower:normal Esophageal Mucosa  Distal: normal Esophageal Mucosa      Gastric findings include: abnormal Gastric Mucosa    Duodenal Findings: normal Duodenal Mucosa    Estimated Blood Loss: none    Biopsy:  Antrum    Complications:  None; patient tolerated the procedure well. Disposition: PACU - hemodynamically stable. Condition: stable    Attending Attestation: I was present and scrubbed for the entire procedure. The resident's documentation has been prepared under my direction and personally reviewed by me in its entirety. I confirm that the note above accurately reflects all work, treatment, procedures, and medical decision making performed by me.  Bernard Vieyra MD

## 2022-02-17 ENCOUNTER — OFFICE VISIT (OUTPATIENT)
Dept: BARIATRICS/WEIGHT MGMT | Age: 52
End: 2022-02-17
Payer: COMMERCIAL

## 2022-02-17 VITALS — HEIGHT: 66 IN | BODY MASS INDEX: 45.52 KG/M2

## 2022-02-17 DIAGNOSIS — E66.01 MORBID OBESITY WITH BMI OF 45.0-49.9, ADULT (HCC): Primary | ICD-10-CM

## 2022-02-17 DIAGNOSIS — E78.5 HYPERLIPIDEMIA, UNSPECIFIED HYPERLIPIDEMIA TYPE: ICD-10-CM

## 2022-02-17 DIAGNOSIS — K21.9 GASTROESOPHAGEAL REFLUX DISEASE WITHOUT ESOPHAGITIS: ICD-10-CM

## 2022-02-17 PROCEDURE — 99213 OFFICE O/P EST LOW 20 MIN: CPT | Performed by: SURGERY

## 2022-02-17 NOTE — PROGRESS NOTES
Cecy Ibarra wt is stable / unchanged. Is pt eating at least 4 times everyday? yes    B- turkey  S- apple & PB  L- CC & fruit  S- fruit & yogurt OR CC OR turkey  D- fish OR red meat   S- none    Is pt eating a lean protein source with all meals and snacks? yes    Has pt decreased their portions using the plate method? yes    Is pt choosing low fat/sugar free options? as a rule    Is pt drinking at least 64 oz of clear liquids everyday? yes - water / water w/ juice    Has pt stopped drinking carbonation, caffeinated, and sugar sweetened beverages? eliminated pepsi / diluted juice maybe ~1oz juice in water    Has pt sampled Unjury and/or Nectar protein? yes - tried & tolerated    Has pt attended a support group?  Completed    Participating in intentional exercise? no    Plan/Recommendations:   - Continue plan  - Add a vegetable daily  - Use treadmill 30 min 2-3x/wk  - Try diet juice  - Watch / measure portions    Handouts: none    Ousmane Kellogg, KATIE, LD

## 2022-02-17 NOTE — PATIENT INSTRUCTIONS
Patient received dietary handouts and education.     Plan/Recommendations:   - Continue plan  - Add a vegetable daily  - Use treadmill 30 min 2-3x/wk  - Try diet juice  - Watch / measure portions

## 2022-02-17 NOTE — PROGRESS NOTES
[employer] [facility]   General & Laparoscopic Surgery  [employer]       HPI:     Latanya Ward is a very pleasant 54 y.o. female with Body mass index is 45.52 kg/m². , Pre-Surgery. Pre-operative clearance and work up pending. Working hard to keep good dietary habits as well level of activity. Patient denies any nausea, vomiting, fevers, chills, shortness of breath, chest pain, cough, constipation or difficulty urinating. Past Medical History:   Diagnosis Date    Cancer ([address]     skin    Depression     Migraine     Pre-operative clearance      Past Surgical History:   Procedure Laterality Date    BREAST ENHANCEMENT SURGERY      COLONOSCOPY N/A 2/14/2022    COLONOSCOPY performed by Jenae Kimball MD at . [address] SKIN TAG REMOVAL      cancer    UPPER GASTROINTESTINAL ENDOSCOPY N/A 2/14/2022    EGD BIOPSY performed by Vonda Alicea DO at [facility] ENDOSCOPY     Family History   Problem Relation Age of Onset    Breast Cancer Mother     Cancer Father         pancreatic    Cancer Maternal Grandmother         lung     Social History     Tobacco Use    Smoking status: Never Smoker    Smokeless tobacco: Never Used   Substance Use Topics    Alcohol use: Not Currently     I counseled the patient on the importance of not smoking and risks of ETOH. Allergies   Allergen Reactions    Prochlorperazine Hives and Other (See Comments)     Vitals:    02/17/22 0802   Height: 5' 6\" (1.676 m)       Body mass index is 45.52 kg/m².       Current Outpatient Medications:     lamoTRIgine (LAMICTAL) 150 MG tablet, , Disp: , Rfl:     pantoprazole (PROTONIX) 20 MG tablet, Take 20 mg by mouth daily, Disp: , Rfl:     sertraline (ZOLOFT) 100 MG tablet, Take 100 mg by mouth 2 times daily, Disp: , Rfl:     Acetaminophen (TYLENOL) 325 MG CAPS, Take 50 mg by mouth nightly as needed, Disp: , Rfl:       Review of Systems - History obtained from the patient  General ROS: negative  Psychological ROS: negative  Endocrine ROS: negative  Respiratory ROS: negative  Cardiovascular ROS: negative  Gastrointestinal ROS:negative  Genito-Urinary ROS: negative  Musculoskeletal ROS: negative   Skin ROS: negative    Physical Exam   Vitals Reviewed   Constitutional: Patient is oriented to person, place, and time. Patient appears well-developed and well-nourished. Patient is active and cooperative. Non-toxic appearance. No distress. Neck: Trachea normal and normal range of motion. No JVD present. Pulmonary/Chest: Effort normal. No accessory muscle usage or stridor. No apnea. No respiratory distress. Cardiovascular: Normal rate and no JVD. Abdominal: Normal appearance. Patient exhibits no distension. Abdomen is soft, obese, non tender. Musculoskeletal: Normal range of motion. Patient exhibits no edema. Neurological: Patient is alert and oriented to person, place, and time. Patient has normal strength. GCS eye subscore is 4. GCS verbal subscore is 5. GCS motor subscore is 6. Skin: Skin is warm and dry. No abrasion and no rash noted. Patient is not diaphoretic. No cyanosis or erythema. Psychiatric: Patient has a normal mood and affect. Speech is normal and behavior is normal. Cognition and memory are normal.       A/P    Jerry Garcia is 54 y.o. female, Body mass index is 45.52 kg/m². pre surgery, has stayed weight stable since last visit. The patient underwent dietary counseling with registered dietician. I have reviewed, discussed and agree with the dietary plan. Patient is trying hard to keep good dietary and behavior modifications. Patient is monitoring portion sizes, food choices and liquid calories. Patient is trying to exercise regularly as much as possible. We discussed how her weight affects her overall health including:  Jennifer Choudhary was seen today for obesity.     Diagnoses and all orders for this visit:    Morbid obesity with BMI of 45.0-49.9, adult ([facility] [address]    Hyperlipidemia, unspecified hyperlipidemia type    Gastroesophageal reflux disease without esophagitis       and importance of weight loss to alleviate those co morbid conditions. I encouraged the patient to continue exercise and keeping healthy eating habits. Discussed pre-op labs and work up till now. Also counseled the patient extensively on Surgery. Total encounter time: 20 minutes including any number of the following: review of labs, imaging, provider notes, outside hospital records; performing examination/evaluation; counseling patient and family; ordering medications/tests; placing referrals and communication with referring physicians; coordination of care, and documentation in the EHR. RTC in 4 weeks  Obtain rest of pre-op work up / clearances  Diet and Exercise      Patient advised that its their responsibility to follow up for studies and/or labs ordered today.      Jeannine Aguilar

## 2022-03-03 ENCOUNTER — OFFICE VISIT (OUTPATIENT)
Dept: BARIATRICS/WEIGHT MGMT | Age: 52
End: 2022-03-03

## 2022-03-03 VITALS — WEIGHT: 277 LBS | BODY MASS INDEX: 44.52 KG/M2 | HEIGHT: 66 IN

## 2022-03-03 DIAGNOSIS — E78.5 HYPERLIPIDEMIA, UNSPECIFIED HYPERLIPIDEMIA TYPE: ICD-10-CM

## 2022-03-03 DIAGNOSIS — K21.9 GASTROESOPHAGEAL REFLUX DISEASE WITHOUT ESOPHAGITIS: ICD-10-CM

## 2022-03-03 DIAGNOSIS — E66.01 MORBID OBESITY WITH BMI OF 45.0-49.9, ADULT (HCC): Primary | ICD-10-CM

## 2022-03-03 PROCEDURE — 99214 OFFICE O/P EST MOD 30 MIN: CPT | Performed by: SURGERY

## 2022-03-03 NOTE — PROGRESS NOTES
[facility]) [facility]   General & Laparoscopic Surgery  [employer]       HPI:     Barber Vargas is a very pleasant 54 y.o. female with Body mass index is 44.71 kg/m². , Pre-Surgery. Pre-operative clearance and work up pending. Working hard to keep good dietary habits as well level of activity. Patient denies any nausea, vomiting, fevers, chills, shortness of breath, chest pain, cough, constipation or difficulty urinating. Past Medical History:   Diagnosis Date    Cancer ([address]     skin    Depression     Migraine     Pre-operative clearance      Past Surgical History:   Procedure Laterality Date    BREAST ENHANCEMENT SURGERY      COLONOSCOPY N/A 2/14/2022    COLONOSCOPY performed by Janice Chopra MD at . [address] SKIN TAG REMOVAL      cancer    UPPER GASTROINTESTINAL ENDOSCOPY N/A 2/14/2022    EGD BIOPSY performed by Sony Hensley DO at [facility] ENDOSCOPY     Family History   Problem Relation Age of Onset    Breast Cancer Mother     Cancer Father         pancreatic    Cancer Maternal Grandmother         lung     Social History     Tobacco Use    Smoking status: Never Smoker    Smokeless tobacco: Never Used   Substance Use Topics    Alcohol use: Not Currently     I counseled the patient on the importance of not smoking and risks of ETOH. Allergies   Allergen Reactions    Prochlorperazine Hives and Other (See Comments)     Vitals:    03/03/22 0734   Weight: 277 lb (125.6 kg)   Height: 5' 6\" (1.676 m)       Body mass index is 44.71 kg/m².       Current Outpatient Medications:     lamoTRIgine (LAMICTAL) 150 MG tablet, , Disp: , Rfl:     pantoprazole (PROTONIX) 20 MG tablet, Take 20 mg by mouth daily, Disp: , Rfl:     sertraline (ZOLOFT) 100 MG tablet, Take 100 mg by mouth 2 times daily, Disp: , Rfl:       Review of Systems - History obtained from the patient  General ROS: negative  Psychological ROS: negative  Endocrine ROS: negative  Respiratory ROS: negative  Cardiovascular ROS: negative  Gastrointestinal ROS:negative  Genito-Urinary ROS: negative  Musculoskeletal ROS: negative   Skin ROS: negative    Physical Exam   Vitals Reviewed   Constitutional: Patient is oriented to person, place, and time. Patient appears well-developed and well-nourished. Patient is active and cooperative. Non-toxic appearance. No distress. Neck: Trachea normal and normal range of motion. No JVD present. Pulmonary/Chest: Effort normal. No accessory muscle usage or stridor. No apnea. No respiratory distress. Cardiovascular: Normal rate and no JVD. Abdominal: Normal appearance. Patient exhibits no distension. Abdomen is soft, obese, non tender. Musculoskeletal: Normal range of motion. Patient exhibits no edema. Neurological: Patient is alert and oriented to person, place, and time. Patient has normal strength. GCS eye subscore is 4. GCS verbal subscore is 5. GCS motor subscore is 6. Skin: Skin is warm and dry. No abrasion and no rash noted. Patient is not diaphoretic. No cyanosis or erythema. Psychiatric: Patient has a normal mood and affect. Speech is normal and behavior is normal. Cognition and memory are normal.       A/P    Bernardo Araiza is 54 y.o. female, Body mass index is 44.71 kg/m². pre surgery, has lost 6# since last visit. The patient underwent dietary counseling with registered dietician. I have reviewed, discussed and agree with the dietary plan. Patient is trying hard to keep good dietary and behavior modifications. Patient is monitoring portion sizes, food choices and liquid calories. Patient is trying to exercise regularly as much as possible. We discussed how her weight affects her overall health including:  Amaris [facility] was seen today for obesity.     Diagnoses and all orders for this visit:    Morbid obesity with BMI of 45.0-49.9, adult ([facility] [address]    Hyperlipidemia, unspecified hyperlipidemia type    Gastroesophageal reflux disease without esophagitis       and importance of weight loss to alleviate those co morbid conditions. I encouraged the patient to continue exercise and keeping healthy eating habits. Discussed pre-op labs and work up till now. Also counseled the patient extensively on Surgery. Following The Metabolic and Bariatric Surgery Accreditation and Quality Improvement Program [facility]), and [employer] (ACS) recommendations, the Bariatric Surgical Risk/Benefit Calculator was used, and report was discussed with patient, who wishes to proceed with surgery, fully understanding the risks and benefits. Of note, [facility] Bariatric Surgical Risk/Benefit Calculator estimates the chance of an unfavorable outcome (such as a complication or death), the chance of remission of weight-related comorbidities, and the patient's BMI, weight change, and percent total weight change after surgery. These quantities are estimated based upon information the patient gives the healthcare provider about prior health history. The estimates are calculated using data from a large number of patients who had a primary bariatric surgical procedure similar to the one the patient may have. Please note the risk percentages, remission percentages, BMI, weight change, and percent total weight change provided to you by the risk/benefit calculator are only estimates. These estimates only take certain information into account. There may be other factors that are not included in the estimate which may increase or decrease the risk of a complication, the chance of remission of a weight-related comorbidity, or the amount of weight the patient loses. These estimates are not a guarantee of results. A complication after surgery may happen even if the risk is low, a weight-related comorbidity may not go into remission even if the chances are high, and a patient may lose more or less weight than predicted.  This information is not intended to replace the advice of a doctor or healthcare provider about the diagnosis, treatment, or potential outcomes. The Provider is not responsible for medical decisions that may be made by the patient based on the risk/benefit calculator estimates, since these estimates are provided for informational purposes. Patients should always consult their doctor or other health care provider before deciding on a treatment plan. Both open and laparoscopic approach were explained in details. Benefits and risks explained. Informed consent obtained. Risks including but not limited to; Infection, bleeding, gastric leak or obstruction, persistent nausea, vomiting, or reflux, injury to surrounding structures, risks of anesthesia, stricture, delayed gastric emptying, staple line leak, incisional hernia, malnutrition , hair loss, and/ or Conversion to Open surgery may be necessary. Failure to lose or maintain weight loss, Gallstones or Kidney Stones, Deep Venous Thrombosis , pulmonary embolism and / or death. I did explain thoroughly to the patient that compliance with pre- and post op diet and other recommendations are integral part to improve the chances of successful weight loss and also not following it could end with serious health complications. We discussed that our goal is to ameliorate the medical problems and not to obtain a specific body mass index. Patient understands the risks and benefits and wishes to proceed with the procedure. Also understands if BMI is lower than 40 without significant co morbid conditions, concerns for risks of surgery being somewhat higher over long run, however patient wants to proceed and fully understands the risks. Clearly BMI over 35 does impose very serious health risks as well chances of losing weight on diet only is very limited and sustaining weight loss is even less, thus surgery is certainly recommended for long term weight loss and better health overall given compliance.      Obesity as a disease is considered a high risk to patients overall health and should therefore be considered a high risk disease state. Now with Covid-19 pandemic, CDC and health authorities does classify obese patients as vulnerable and high risk as well. Which makes weight loss a priority for improvement of their wellbeing and overall health. I advised the patient that we can't guarantee final insurance approval.        I advised the patient that sometimes other indicated procedures may arise and the decision will be based on my assessment intraoperatively. Some may include include but not limited to:  [Ventral Hernia, Risks include but not limited to; infection, bleeding, injury to organs or nerves or vessels, PE, DVT, cardiac events, , persistent pain or symptoms, abscess, hernia recurrence or need for further procedures. However that will be determined intra-operatively, if not safe to proceed , then any additional procedures will have to be addressed later as primary goal is bariatric surgery.]      [ Hiatal Hernia, will attempt repair,  risks and benefits including but not limited to; hemothorax, pneumothorax, recurrence, difficulty swallowing, persistent symptoms, reflux and need for medications, esophageal, splenic, lung, heart, bowel, vagus nerve or gastric injuries. However that will be determined intra-operatively, if not safe to proceed, then any additional procedures will have to be addressed later as primary goal is bariatric surgery.]     Patient understands that there may be a need to perform other urgent or necessary procedures that were unanticipated. Patient consents to the performance of any additional procedures determined during the original procedure to be in their best interests and where delay might cause additional harm or put patient at risk for additional anesthesia risk for required by a second procedure and that will be determined by the surgeon.     Patient is aware if Weight gain occurs in pre-op period while on pre-operative diet or  non compliant with it , surgery will be canceled. Patient understands that in relation to the COVID-19 pandemic and surgical procedures, they have been given the opportunity to postpone   surgery until a  later date, and has chosen to proceed with surgery knowing the risks associated with COVID-19. Patient was satisfied with the discussion we had and had no further questions at end of visit and wants to proceed with surgery. 1- Pre-operative work up ordered for you(labs/x-rays/EKG). 2- Stop taking Blood thinners,one week before surgery. 3- F/U with PCP for pre-op Medical Clearance. 4- Plan for Robotic Sleeve, possible other indicated procedures. Patient advised that its their responsibility to follow up for studies, referrals and/or labs ordered today.        Abigail Goltz

## 2022-03-03 NOTE — PATIENT INSTRUCTIONS
Patient received dietary handouts and education.     Plan/Recommendations:   Continue plan  Aim to walk qod for 30 min

## 2022-03-03 NOTE — PROGRESS NOTES
Lewis Ibarra lost 6 lbs over past ~month. Is pt eating at least 4 times everyday? yes     B- deli turkey OR protein shake  S- fruit & yogurt OR apple & PB  L- smartone  S- sometimes: similar to AM  D- protein w/ some vegetables, trying to limit / avoid starches  S- none    Is pt eating a lean protein source with all meals and snacks? yes    Has pt decreased their portions using the plate method? yes    Is pt choosing low fat/sugar free options? yes    Is pt drinking at least 64 oz of clear liquids everyday? yes - water / diet juice diluted / franco    Has pt stopped drinking carbonation, caffeinated, and sugar sweetened beverages? yes    Has pt sampled Unjury and/or Nectar protein? yes - tried & tolerated    Has pt attended a support group?  Completed    Participating in intentional exercise? yes - walking 2-3x/wk for 30 min    Plan/Recommendations:   Continue plan  Aim to walk qod for 30 min    Handouts: none    George Magdaleno, KATIE, LD

## 2022-03-07 ASSESSMENT — ENCOUNTER SYMPTOMS
GASTROINTESTINAL NEGATIVE: 1
RESPIRATORY NEGATIVE: 1
ALLERGIC/IMMUNOLOGIC NEGATIVE: 1
SHORTNESS OF BREATH: 0
EYES NEGATIVE: 1
COUGH: 0

## 2022-03-14 NOTE — PROGRESS NOTES
Patient Name:   Nkechi Byrne      Type of Surgery:  Sleeve         Date of Surgery:  4/18/22       Start Pre-Op Diet On:  4/5/22        Start Clear Liquids On:  4/17/22         NPO after midnight the night before your surgery! Take morning medications with a small amount of water.     NOTES:_______________________________________  ______________________________________________

## 2022-03-22 ENCOUNTER — OFFICE VISIT (OUTPATIENT)
Dept: BARIATRICS/WEIGHT MGMT | Age: 52
End: 2022-03-22

## 2022-03-22 VITALS — BODY MASS INDEX: 45.1 KG/M2 | HEIGHT: 66 IN | WEIGHT: 280.6 LBS

## 2022-03-22 DIAGNOSIS — E66.01 MORBID OBESITY WITH BMI OF 40.0-44.9, ADULT (HCC): ICD-10-CM

## 2022-03-22 DIAGNOSIS — K21.9 GASTROESOPHAGEAL REFLUX DISEASE WITHOUT ESOPHAGITIS: Primary | ICD-10-CM

## 2022-03-22 PROCEDURE — 99214 OFFICE O/P EST MOD 30 MIN: CPT | Performed by: NURSE PRACTITIONER

## 2022-03-25 NOTE — PROGRESS NOTES
Name_______________________________________Printed:____________________  Date and time of surgery___4/18  0730____________________Arrival Time:___0600_____________   1. The instructions given regarding when and if a patient needs to stop oral intake prior to surgery varies. Follow the specific instructions you were given                  ___xNothing to eat or to drink after Midnight the night before.                   ____Carbo loading or ERAS instructions will be given to select patients-if you have been given those instructions -please do the following                           The evening before your surgery after dinner before midnight drink 40 ounces of gatorade. If you are diabetic use sugar free. The morning of surgery drink 40 ounces of water. This needs to be finished 3 hours prior to your surgery start time. 2. Take the following pills with a small sip of water on the morning of surgery____ none_______________________________________________                  Do not take blood pressure medications ending in pril or sartan the sundar prior to surgery or the morning of surgery_   3. Aspirin, Ibuprofen, Advil, Naproxen, Vitamin E and other Anti-inflammatory products and supplements should be stopped for 5 -7days before surgery or as directed by your physician. 4. Check with your Doctor regarding stopping Plavix, Coumadin,Eliquis, Lovenox,Effient,Pradaxa,Xarelto, Fragmin or other blood thinners and follow their instructions. 5. Do not smoke, and do not drink any alcoholic beverages 24 hours prior to surgery. This includes NA Beer. Refrain from the usage of any recreational drugs. 6. You may brush your teeth and gargle the morning of surgery. DO NOT SWALLOW WATER   7. You MUST make arrangements for a responsible adult to stay on site while you are here and take you home after your surgery. You will not be allowed to leave alone or drive yourself home.   It is strongly suggested someone stay with you the first 24 hrs. Your surgery will be cancelled if you do not have a ride home. 8. A parent/legal guardian must accompany a child scheduled for surgery and plan to stay at the hospital until the child is discharged. Please do not bring other children with you. 9. Please wear simple, loose fitting clothing to the hospital.  Luis Antonio Parsons not bring valuables (money, credit cards, checkbooks, etc.) Do not wear any makeup (including no eye makeup) or nail polish on your fingers or toes. 10. DO NOT wear any jewelry or piercings on day of surgery. All body piercing jewelry must be removed. 11. If you have ___dentures, they will be removed before going to the OR; we will provide you a container. If you wear ___contact lenses or ___glasses, they will be removed; please bring a case for them. 12. Please see your family doctor/pediatrician for a history & physical and/or concerning medications. Bring any test results/reports from your physician's office. PCP__________________Phone___________H&P Appt. Date________             13 If you  have a Living Will and Durable Power of  for Healthcare, please bring in a copy. 15. Notify your Surgeon if you develop any illness between now and surgery  time, cough, cold, fever, sore throat, nausea, vomiting, etc.  Please notify your surgeon if you experience dizziness, shortness of breath or blurred vision between now & the time of your surgery             15. DO NOT shave your operative site 96 hours prior to surgery. For face & neck surgery, men may use an electric razor 48 hours prior to surgery. 16. Shower the night before or morning of surgery using an antibacterial soap or as you have been instructed. 17. To provide excellent care visitors will be limited to one in the room at any given time. 18.  Please bring picture ID and insurance card.              19.  Visit our web site for additional information:  [email or web address]              20.During flu season no children under the age of 15 are permitted in the hospital for the safety of all patients. 21. If you take a long acting insulin in the evening only  take half of your usual  dose the night  before your procedure              22. If you use a c-pap please bring DOS if staying overnight,             23.For your convenience [facility] has a pharmacy on site to fill your prescriptions. 24. If you use oxygen and have a portable tank please bring it  with you the DOS             25. Bring a complete list of all your medications with name and dose include any supplements. 26. Other__pcp and PATs 3/31________________________________________   *Please call pre admission testing if you any further questions   Flavia Olivares         [phone]   Magn[address] [place]  [phone]   [address] [place] [address]       VISITOR POLICY(subject to change)    Current policy is 2 visitors per patient. No children. A mask is required. Visiting hours are 8a-8p. Overnight visitors will be at the discretion of the nurse. All above information reviewed with patient in person or by phone. Patient verbalizes understanding. All questions and concerns addressed.                                                                                                  Patient/Rep____per phone/pt________________                                                                                                                                    PRE OP INSTRUCTIONS

## 2022-04-04 NOTE — PROGRESS NOTES
VM left for patient with time change on 4/18/22 to 1030 with an arrival 0830.number left asking for call back regarding H/P and PATS        VM left at PCP number -patient provided- asking for them to fax H/P and PATS

## 2022-04-14 ENCOUNTER — TELEPHONE (OUTPATIENT)
Dept: BARIATRICS/WEIGHT MGMT | Age: 52
End: 2022-04-14

## 2022-04-14 NOTE — TELEPHONE ENCOUNTER
LM for pt to confirm her 9 am arrival for her 10:30 am surgery on 4/18/22 at [facility].  Pt reminded to follow her clear liquid diet as advised and be NPO after midnight on Sunday pm.

## 2022-04-18 ENCOUNTER — HOSPITAL ENCOUNTER (INPATIENT)
Age: 52
LOS: 1 days | Discharge: HOME | DRG: 983 | End: 2022-04-19
Attending: SURGERY | Admitting: SURGERY
Payer: COMMERCIAL

## 2022-04-18 ENCOUNTER — ANESTHESIA EVENT (OUTPATIENT)
Dept: OPERATING ROOM | Age: 52
DRG: 983 | End: 2022-04-18

## 2022-04-18 ENCOUNTER — ANESTHESIA (OUTPATIENT)
Dept: OPERATING ROOM | Age: 52
DRG: 983 | End: 2022-04-18

## 2022-04-18 VITALS
OXYGEN SATURATION: 98 % | SYSTOLIC BLOOD PRESSURE: 135 MMHG | TEMPERATURE: 97.5 F | RESPIRATION RATE: 12 BRPM | DIASTOLIC BLOOD PRESSURE: 72 MMHG

## 2022-04-18 DIAGNOSIS — Z98.84 S/P LAPAROSCOPIC SLEEVE GASTRECTOMY: Primary | ICD-10-CM

## 2022-04-18 LAB
ABO/RH: NORMAL
ANTIBODY SCREEN: NORMAL
GLUCOSE BLD-MCNC: 81 MG/DL (ref 70–99)
HCG(URINE) PREGNANCY TEST: NEGATIVE
PERFORMED ON: NORMAL

## 2022-04-18 PROCEDURE — 43775 LAP SLEEVE GASTRECTOMY: CPT | Performed by: SURGERY

## 2022-04-18 PROCEDURE — A4217 STERILE WATER/SALINE, 500 ML: HCPCS | Performed by: SURGERY

## 2022-04-18 PROCEDURE — S2900 ROBOTIC SURGICAL SYSTEM: HCPCS | Performed by: SURGERY

## 2022-04-18 PROCEDURE — 0WQF4ZZ REPAIR ABDOMINAL WALL, PERCUTANEOUS ENDOSCOPIC APPROACH: ICD-10-PCS | Performed by: SURGERY

## 2022-04-18 PROCEDURE — 2580000003 HC RX 258: Performed by: SURGERY

## 2022-04-18 PROCEDURE — 6360000002 HC RX W HCPCS: Performed by: SURGERY

## 2022-04-18 PROCEDURE — 86901 BLOOD TYPING SEROLOGIC RH(D): CPT

## 2022-04-18 PROCEDURE — 1200000000 HC SEMI PRIVATE

## 2022-04-18 PROCEDURE — 2500000003 HC RX 250 WO HCPCS: Performed by: NURSE ANESTHETIST, CERTIFIED REGISTERED

## 2022-04-18 PROCEDURE — 3600000019 HC SURGERY ROBOT ADDTL 15MIN: Performed by: SURGERY

## 2022-04-18 PROCEDURE — 6360000002 HC RX W HCPCS: Performed by: NURSE ANESTHETIST, CERTIFIED REGISTERED

## 2022-04-18 PROCEDURE — 2700000000 HC OXYGEN THERAPY PER DAY

## 2022-04-18 PROCEDURE — 84703 CHORIONIC GONADOTROPIN ASSAY: CPT

## 2022-04-18 PROCEDURE — 3700000001 HC ADD 15 MINUTES (ANESTHESIA): Performed by: SURGERY

## 2022-04-18 PROCEDURE — 8E0W4CZ ROBOTIC ASSISTED PROCEDURE OF TRUNK REGION, PERCUTANEOUS ENDOSCOPIC APPROACH: ICD-10-PCS | Performed by: SURGERY

## 2022-04-18 PROCEDURE — 43281 LAP PARAESOPHAG HERN REPAIR: CPT | Performed by: SURGERY

## 2022-04-18 PROCEDURE — 36415 COLL VENOUS BLD VENIPUNCTURE: CPT

## 2022-04-18 PROCEDURE — 88307 TISSUE EXAM BY PATHOLOGIST: CPT

## 2022-04-18 PROCEDURE — 86900 BLOOD TYPING SEROLOGIC ABO: CPT

## 2022-04-18 PROCEDURE — 3700000000 HC ANESTHESIA ATTENDED CARE: Performed by: SURGERY

## 2022-04-18 PROCEDURE — 7100000000 HC PACU RECOVERY - FIRST 15 MIN: Performed by: SURGERY

## 2022-04-18 PROCEDURE — 6370000000 HC RX 637 (ALT 250 FOR IP): Performed by: SURGERY

## 2022-04-18 PROCEDURE — 2709999900 HC NON-CHARGEABLE SUPPLY: Performed by: SURGERY

## 2022-04-18 PROCEDURE — 2720000010 HC SURG SUPPLY STERILE: Performed by: SURGERY

## 2022-04-18 PROCEDURE — 7100000001 HC PACU RECOVERY - ADDTL 15 MIN: Performed by: SURGERY

## 2022-04-18 PROCEDURE — C9113 INJ PANTOPRAZOLE SODIUM, VIA: HCPCS | Performed by: SURGERY

## 2022-04-18 PROCEDURE — 2500000003 HC RX 250 WO HCPCS: Performed by: SURGERY

## 2022-04-18 PROCEDURE — 86850 RBC ANTIBODY SCREEN: CPT

## 2022-04-18 PROCEDURE — 2580000003 HC RX 258: Performed by: NURSE ANESTHETIST, CERTIFIED REGISTERED

## 2022-04-18 PROCEDURE — 94761 N-INVAS EAR/PLS OXIMETRY MLT: CPT

## 2022-04-18 PROCEDURE — 3600000009 HC SURGERY ROBOT BASE: Performed by: SURGERY

## 2022-04-18 RX ORDER — ONDANSETRON 2 MG/ML
INJECTION INTRAMUSCULAR; INTRAVENOUS PRN
Status: DISCONTINUED | OUTPATIENT
Start: 2022-04-18 | End: 2022-04-18 | Stop reason: SDUPTHER

## 2022-04-18 RX ORDER — PROPOFOL 10 MG/ML
INJECTION, EMULSION INTRAVENOUS PRN
Status: DISCONTINUED | OUTPATIENT
Start: 2022-04-18 | End: 2022-04-18 | Stop reason: SDUPTHER

## 2022-04-18 RX ORDER — MEPERIDINE HYDROCHLORIDE 25 MG/ML
12.5 INJECTION INTRAMUSCULAR; INTRAVENOUS; SUBCUTANEOUS EVERY 5 MIN PRN
Status: DISCONTINUED | OUTPATIENT
Start: 2022-04-18 | End: 2022-04-18 | Stop reason: HOSPADM

## 2022-04-18 RX ORDER — MAGNESIUM SULFATE HEPTAHYDRATE 500 MG/ML
INJECTION, SOLUTION INTRAMUSCULAR; INTRAVENOUS PRN
Status: DISCONTINUED | OUTPATIENT
Start: 2022-04-18 | End: 2022-04-18 | Stop reason: SDUPTHER

## 2022-04-18 RX ORDER — BUPIVACAINE HYDROCHLORIDE 5 MG/ML
INJECTION, SOLUTION EPIDURAL; INTRACAUDAL
Status: COMPLETED | OUTPATIENT
Start: 2022-04-18 | End: 2022-04-18

## 2022-04-18 RX ORDER — SODIUM CHLORIDE, SODIUM LACTATE, POTASSIUM CHLORIDE, CALCIUM CHLORIDE 600; 310; 30; 20 MG/100ML; MG/100ML; MG/100ML; MG/100ML
INJECTION, SOLUTION INTRAVENOUS CONTINUOUS PRN
Status: DISCONTINUED | OUTPATIENT
Start: 2022-04-18 | End: 2022-04-18 | Stop reason: SDUPTHER

## 2022-04-18 RX ORDER — SODIUM CHLORIDE 0.9 % (FLUSH) 0.9 %
5-40 SYRINGE (ML) INJECTION PRN
Status: DISCONTINUED | OUTPATIENT
Start: 2022-04-18 | End: 2022-04-19 | Stop reason: HOSPADM

## 2022-04-18 RX ORDER — NALOXONE HYDROCHLORIDE 1 MG/ML
0.4 INJECTION INTRAMUSCULAR; INTRAVENOUS; SUBCUTANEOUS PRN
Status: DISCONTINUED | OUTPATIENT
Start: 2022-04-18 | End: 2022-04-19

## 2022-04-18 RX ORDER — ONDANSETRON 2 MG/ML
4 INJECTION INTRAMUSCULAR; INTRAVENOUS EVERY 6 HOURS PRN
Status: DISCONTINUED | OUTPATIENT
Start: 2022-04-18 | End: 2022-04-19 | Stop reason: HOSPADM

## 2022-04-18 RX ORDER — APREPITANT 80 MG/1
80 CAPSULE ORAL ONCE
Status: COMPLETED | OUTPATIENT
Start: 2022-04-18 | End: 2022-04-18

## 2022-04-18 RX ORDER — PHENYLEPHRINE HCL IN 0.9% NACL 1 MG/10 ML
SYRINGE (ML) INTRAVENOUS PRN
Status: DISCONTINUED | OUTPATIENT
Start: 2022-04-18 | End: 2022-04-18 | Stop reason: SDUPTHER

## 2022-04-18 RX ORDER — SODIUM CHLORIDE 9 MG/ML
25 INJECTION, SOLUTION INTRAVENOUS PRN
Status: DISCONTINUED | OUTPATIENT
Start: 2022-04-18 | End: 2022-04-19 | Stop reason: HOSPADM

## 2022-04-18 RX ORDER — SODIUM CHLORIDE 9 MG/ML
INJECTION, SOLUTION INTRAVENOUS PRN
Status: DISCONTINUED | OUTPATIENT
Start: 2022-04-18 | End: 2022-04-18 | Stop reason: HOSPADM

## 2022-04-18 RX ORDER — SCOLOPAMINE TRANSDERMAL SYSTEM 1 MG/1
1 PATCH, EXTENDED RELEASE TRANSDERMAL ONCE
Status: DISCONTINUED | OUTPATIENT
Start: 2022-04-18 | End: 2022-04-19 | Stop reason: HOSPADM

## 2022-04-18 RX ORDER — HYDROMORPHONE HCL 110MG/55ML
PATIENT CONTROLLED ANALGESIA SYRINGE INTRAVENOUS PRN
Status: DISCONTINUED | OUTPATIENT
Start: 2022-04-18 | End: 2022-04-18 | Stop reason: SDUPTHER

## 2022-04-18 RX ORDER — LIDOCAINE HYDROCHLORIDE 10 MG/ML
0.5 INJECTION, SOLUTION EPIDURAL; INFILTRATION; INTRACAUDAL; PERINEURAL ONCE
Status: DISCONTINUED | OUTPATIENT
Start: 2022-04-18 | End: 2022-04-18 | Stop reason: HOSPADM

## 2022-04-18 RX ORDER — ENALAPRILAT 2.5 MG/2ML
1.25 INJECTION INTRAVENOUS EVERY 6 HOURS PRN
Status: DISCONTINUED | OUTPATIENT
Start: 2022-04-18 | End: 2022-04-19 | Stop reason: HOSPADM

## 2022-04-18 RX ORDER — ACETAMINOPHEN 160 MG/5ML
650 SOLUTION ORAL ONCE
Status: COMPLETED | OUTPATIENT
Start: 2022-04-18 | End: 2022-04-18

## 2022-04-18 RX ORDER — KETAMINE HYDROCHLORIDE 10 MG/ML
INJECTION, SOLUTION INTRAMUSCULAR; INTRAVENOUS PRN
Status: DISCONTINUED | OUTPATIENT
Start: 2022-04-18 | End: 2022-04-18 | Stop reason: SDUPTHER

## 2022-04-18 RX ORDER — SUCCINYLCHOLINE/SOD CL,ISO/PF 200MG/10ML
SYRINGE (ML) INTRAVENOUS PRN
Status: DISCONTINUED | OUTPATIENT
Start: 2022-04-18 | End: 2022-04-18 | Stop reason: SDUPTHER

## 2022-04-18 RX ORDER — LIDOCAINE HYDROCHLORIDE 20 MG/ML
INJECTION, SOLUTION EPIDURAL; INFILTRATION; INTRACAUDAL; PERINEURAL PRN
Status: DISCONTINUED | OUTPATIENT
Start: 2022-04-18 | End: 2022-04-18 | Stop reason: SDUPTHER

## 2022-04-18 RX ORDER — GLYCOPYRROLATE 1 MG/5 ML
SYRINGE (ML) INTRAVENOUS PRN
Status: DISCONTINUED | OUTPATIENT
Start: 2022-04-18 | End: 2022-04-18 | Stop reason: SDUPTHER

## 2022-04-18 RX ORDER — SODIUM CHLORIDE 0.9 % (FLUSH) 0.9 %
5-40 SYRINGE (ML) INJECTION PRN
Status: DISCONTINUED | OUTPATIENT
Start: 2022-04-18 | End: 2022-04-18 | Stop reason: HOSPADM

## 2022-04-18 RX ORDER — SODIUM CHLORIDE 9 MG/ML
INJECTION, SOLUTION INTRAVENOUS CONTINUOUS
Status: DISCONTINUED | OUTPATIENT
Start: 2022-04-18 | End: 2022-04-19 | Stop reason: HOSPADM

## 2022-04-18 RX ORDER — METOCLOPRAMIDE HYDROCHLORIDE 5 MG/ML
10 INJECTION INTRAMUSCULAR; INTRAVENOUS EVERY 6 HOURS PRN
Status: DISCONTINUED | OUTPATIENT
Start: 2022-04-18 | End: 2022-04-19 | Stop reason: HOSPADM

## 2022-04-18 RX ORDER — DEXAMETHASONE SODIUM PHOSPHATE 4 MG/ML
INJECTION, SOLUTION INTRA-ARTICULAR; INTRALESIONAL; INTRAMUSCULAR; INTRAVENOUS; SOFT TISSUE PRN
Status: DISCONTINUED | OUTPATIENT
Start: 2022-04-18 | End: 2022-04-18 | Stop reason: SDUPTHER

## 2022-04-18 RX ORDER — EPHEDRINE SULFATE/0.9% NACL/PF 50 MG/5 ML
SYRINGE (ML) INTRAVENOUS PRN
Status: DISCONTINUED | OUTPATIENT
Start: 2022-04-18 | End: 2022-04-18 | Stop reason: SDUPTHER

## 2022-04-18 RX ORDER — VECURONIUM BROMIDE 1 MG/ML
INJECTION, POWDER, LYOPHILIZED, FOR SOLUTION INTRAVENOUS PRN
Status: DISCONTINUED | OUTPATIENT
Start: 2022-04-18 | End: 2022-04-18 | Stop reason: SDUPTHER

## 2022-04-18 RX ORDER — HYDROMORPHONE HCL 110MG/55ML
0.5 PATIENT CONTROLLED ANALGESIA SYRINGE INTRAVENOUS EVERY 5 MIN PRN
Status: DISCONTINUED | OUTPATIENT
Start: 2022-04-18 | End: 2022-04-18 | Stop reason: HOSPADM

## 2022-04-18 RX ORDER — METHYLENE BLUE 10 MG/ML
INJECTION INTRAVENOUS
Status: COMPLETED | OUTPATIENT
Start: 2022-04-18 | End: 2022-04-18

## 2022-04-18 RX ORDER — HYDROMORPHONE HCL IN WATER/PF 6 MG/30 ML
PATIENT CONTROLLED ANALGESIA SYRINGE INTRAVENOUS CONTINUOUS
Status: DISCONTINUED | OUTPATIENT
Start: 2022-04-18 | End: 2022-04-19

## 2022-04-18 RX ORDER — MAGNESIUM HYDROXIDE 1200 MG/15ML
LIQUID ORAL CONTINUOUS PRN
Status: COMPLETED | OUTPATIENT
Start: 2022-04-18 | End: 2022-04-18

## 2022-04-18 RX ORDER — HYOSCYAMINE SULFATE 0.5 MG/ML
250 INJECTION, SOLUTION SUBCUTANEOUS EVERY 4 HOURS PRN
Status: DISCONTINUED | OUTPATIENT
Start: 2022-04-18 | End: 2022-04-19 | Stop reason: HOSPADM

## 2022-04-18 RX ORDER — SODIUM CHLORIDE 0.9 % (FLUSH) 0.9 %
5-40 SYRINGE (ML) INJECTION EVERY 12 HOURS SCHEDULED
Status: DISCONTINUED | OUTPATIENT
Start: 2022-04-18 | End: 2022-04-19 | Stop reason: HOSPADM

## 2022-04-18 RX ORDER — PREGABALIN 75 MG/1
150 CAPSULE ORAL ONCE
Status: COMPLETED | OUTPATIENT
Start: 2022-04-18 | End: 2022-04-18

## 2022-04-18 RX ORDER — SCOLOPAMINE TRANSDERMAL SYSTEM 1 MG/1
1 PATCH, EXTENDED RELEASE TRANSDERMAL
Status: DISCONTINUED | OUTPATIENT
Start: 2022-04-18 | End: 2022-04-19 | Stop reason: HOSPADM

## 2022-04-18 RX ORDER — SODIUM CHLORIDE, SODIUM LACTATE, POTASSIUM CHLORIDE, CALCIUM CHLORIDE 600; 310; 30; 20 MG/100ML; MG/100ML; MG/100ML; MG/100ML
INJECTION, SOLUTION INTRAVENOUS CONTINUOUS
Status: DISCONTINUED | OUTPATIENT
Start: 2022-04-18 | End: 2022-04-19 | Stop reason: HOSPADM

## 2022-04-18 RX ORDER — ONDANSETRON 2 MG/ML
4 INJECTION INTRAMUSCULAR; INTRAVENOUS
Status: DISCONTINUED | OUTPATIENT
Start: 2022-04-18 | End: 2022-04-18 | Stop reason: HOSPADM

## 2022-04-18 RX ORDER — SODIUM CHLORIDE, SODIUM LACTATE, POTASSIUM CHLORIDE, CALCIUM CHLORIDE 600; 310; 30; 20 MG/100ML; MG/100ML; MG/100ML; MG/100ML
INJECTION, SOLUTION INTRAVENOUS CONTINUOUS PRN
Status: COMPLETED | OUTPATIENT
Start: 2022-04-18 | End: 2022-04-18

## 2022-04-18 RX ORDER — PANTOPRAZOLE SODIUM 40 MG/10ML
40 INJECTION, POWDER, LYOPHILIZED, FOR SOLUTION INTRAVENOUS DAILY
Status: DISCONTINUED | OUTPATIENT
Start: 2022-04-18 | End: 2022-04-19 | Stop reason: HOSPADM

## 2022-04-18 RX ORDER — SODIUM CHLORIDE 0.9 % (FLUSH) 0.9 %
5-40 SYRINGE (ML) INJECTION EVERY 12 HOURS SCHEDULED
Status: DISCONTINUED | OUTPATIENT
Start: 2022-04-18 | End: 2022-04-18 | Stop reason: HOSPADM

## 2022-04-18 RX ORDER — FENTANYL CITRATE 50 UG/ML
INJECTION, SOLUTION INTRAMUSCULAR; INTRAVENOUS PRN
Status: DISCONTINUED | OUTPATIENT
Start: 2022-04-18 | End: 2022-04-18 | Stop reason: SDUPTHER

## 2022-04-18 RX ADMIN — KETAMINE HYDROCHLORIDE 10 MG: 10 INJECTION, SOLUTION INTRAMUSCULAR; INTRAVENOUS at 10:33

## 2022-04-18 RX ADMIN — HYDROMORPHONE HYDROCHLORIDE 0.5 MG: 2 INJECTION, SOLUTION INTRAMUSCULAR; INTRAVENOUS; SUBCUTANEOUS at 12:22

## 2022-04-18 RX ADMIN — PROPOFOL 50 MG: 10 INJECTION, EMULSION INTRAVENOUS at 12:18

## 2022-04-18 RX ADMIN — PANTOPRAZOLE SODIUM 40 MG: 40 INJECTION, POWDER, FOR SOLUTION INTRAVENOUS at 20:42

## 2022-04-18 RX ADMIN — ENOXAPARIN SODIUM 30 MG: 30 INJECTION SUBCUTANEOUS at 08:54

## 2022-04-18 RX ADMIN — SODIUM CHLORIDE, POTASSIUM CHLORIDE, SODIUM LACTATE AND CALCIUM CHLORIDE: 600; 310; 30; 20 INJECTION, SOLUTION INTRAVENOUS at 08:52

## 2022-04-18 RX ADMIN — LIDOCAINE HYDROCHLORIDE 100 MG: 20 INJECTION, SOLUTION EPIDURAL; INFILTRATION; INTRACAUDAL; PERINEURAL at 10:12

## 2022-04-18 RX ADMIN — PROPOFOL 150 MG: 10 INJECTION, EMULSION INTRAVENOUS at 10:12

## 2022-04-18 RX ADMIN — CEFAZOLIN SODIUM 3000 MG: 10 INJECTION, POWDER, FOR SOLUTION INTRAVENOUS at 10:03

## 2022-04-18 RX ADMIN — KETAMINE HYDROCHLORIDE 10 MG: 10 INJECTION, SOLUTION INTRAMUSCULAR; INTRAVENOUS at 11:59

## 2022-04-18 RX ADMIN — Medication 200 MCG: at 10:43

## 2022-04-18 RX ADMIN — SODIUM CHLORIDE, POTASSIUM CHLORIDE, SODIUM LACTATE AND CALCIUM CHLORIDE: 600; 310; 30; 20 INJECTION, SOLUTION INTRAVENOUS at 12:18

## 2022-04-18 RX ADMIN — SODIUM CHLORIDE: 9 INJECTION, SOLUTION INTRAVENOUS at 15:40

## 2022-04-18 RX ADMIN — ONDANSETRON 4 MG: 2 INJECTION INTRAMUSCULAR; INTRAVENOUS at 10:20

## 2022-04-18 RX ADMIN — ACETAMINOPHEN ORAL SOLUTION 650 MG: 650 SOLUTION ORAL at 08:53

## 2022-04-18 RX ADMIN — PREGABALIN 150 MG: 75 CAPSULE ORAL at 08:57

## 2022-04-18 RX ADMIN — Medication 0.2 MG: at 10:25

## 2022-04-18 RX ADMIN — FENTANYL CITRATE 50 MCG: 50 INJECTION, SOLUTION INTRAMUSCULAR; INTRAVENOUS at 10:12

## 2022-04-18 RX ADMIN — Medication 10 MG: at 11:12

## 2022-04-18 RX ADMIN — SODIUM CHLORIDE, POTASSIUM CHLORIDE, SODIUM LACTATE AND CALCIUM CHLORIDE: 600; 310; 30; 20 INJECTION, SOLUTION INTRAVENOUS at 10:06

## 2022-04-18 RX ADMIN — Medication 10 MG: at 10:46

## 2022-04-18 RX ADMIN — Medication 0.2 MG: at 10:43

## 2022-04-18 RX ADMIN — ENOXAPARIN SODIUM 40 MG: 40 INJECTION SUBCUTANEOUS at 23:00

## 2022-04-18 RX ADMIN — Medication 200 MCG: at 10:25

## 2022-04-18 RX ADMIN — VECURONIUM BROMIDE 10 MG: 1 INJECTION, POWDER, LYOPHILIZED, FOR SOLUTION INTRAVENOUS at 10:20

## 2022-04-18 RX ADMIN — FENTANYL CITRATE 50 MCG: 50 INJECTION, SOLUTION INTRAMUSCULAR; INTRAVENOUS at 12:31

## 2022-04-18 RX ADMIN — VECURONIUM BROMIDE 2 MG: 1 INJECTION, POWDER, LYOPHILIZED, FOR SOLUTION INTRAVENOUS at 11:47

## 2022-04-18 RX ADMIN — DEXAMETHASONE SODIUM PHOSPHATE 8 MG: 4 INJECTION, SOLUTION INTRAMUSCULAR; INTRAVENOUS at 10:20

## 2022-04-18 RX ADMIN — Medication 160 MG: at 10:12

## 2022-04-18 RX ADMIN — SUGAMMADEX 200 MG: 100 INJECTION, SOLUTION INTRAVENOUS at 12:25

## 2022-04-18 RX ADMIN — Medication 10 MG: at 13:21

## 2022-04-18 RX ADMIN — KETAMINE HYDROCHLORIDE 10 MG: 10 INJECTION, SOLUTION INTRAMUSCULAR; INTRAVENOUS at 10:51

## 2022-04-18 RX ADMIN — APREPITANT 80 MG: 80 CAPSULE ORAL at 08:56

## 2022-04-18 RX ADMIN — MAGNESIUM SULFATE HEPTAHYDRATE 1 G: 500 INJECTION, SOLUTION INTRAMUSCULAR; INTRAVENOUS at 10:20

## 2022-04-18 ASSESSMENT — PULMONARY FUNCTION TESTS
PIF_VALUE: 25
PIF_VALUE: 33
PIF_VALUE: 29
PIF_VALUE: 30
PIF_VALUE: 32
PIF_VALUE: 33
PIF_VALUE: 30
PIF_VALUE: 29
PIF_VALUE: 1
PIF_VALUE: 25
PIF_VALUE: 21
PIF_VALUE: 1
PIF_VALUE: 33
PIF_VALUE: 33
PIF_VALUE: 17
PIF_VALUE: 1
PIF_VALUE: 22
PIF_VALUE: 25
PIF_VALUE: 33
PIF_VALUE: 17
PIF_VALUE: 33
PIF_VALUE: 26
PIF_VALUE: 32
PIF_VALUE: 29
PIF_VALUE: 34
PIF_VALUE: 21
PIF_VALUE: 29
PIF_VALUE: 28
PIF_VALUE: 33
PIF_VALUE: 23
PIF_VALUE: 29
PIF_VALUE: 23
PIF_VALUE: 26
PIF_VALUE: 27
PIF_VALUE: 1
PIF_VALUE: 5
PIF_VALUE: 33
PIF_VALUE: 33
PIF_VALUE: 32
PIF_VALUE: 30
PIF_VALUE: 26
PIF_VALUE: 29
PIF_VALUE: 29
PIF_VALUE: 36
PIF_VALUE: 33
PIF_VALUE: 30
PIF_VALUE: 31
PIF_VALUE: 32
PIF_VALUE: 29
PIF_VALUE: 0
PIF_VALUE: 23
PIF_VALUE: 17
PIF_VALUE: 32
PIF_VALUE: 33
PIF_VALUE: 23
PIF_VALUE: 34
PIF_VALUE: 22
PIF_VALUE: 30
PIF_VALUE: 28
PIF_VALUE: 34
PIF_VALUE: 28
PIF_VALUE: 26
PIF_VALUE: 33
PIF_VALUE: 29
PIF_VALUE: 33
PIF_VALUE: 32
PIF_VALUE: 33
PIF_VALUE: 34
PIF_VALUE: 33
PIF_VALUE: 34
PIF_VALUE: 23
PIF_VALUE: 1
PIF_VALUE: 28
PIF_VALUE: 29
PIF_VALUE: 1
PIF_VALUE: 33
PIF_VALUE: 34
PIF_VALUE: 21
PIF_VALUE: 32
PIF_VALUE: 30
PIF_VALUE: 1
PIF_VALUE: 33
PIF_VALUE: 33
PIF_VALUE: 28
PIF_VALUE: 33
PIF_VALUE: 30
PIF_VALUE: 32
PIF_VALUE: 5
PIF_VALUE: 23
PIF_VALUE: 27
PIF_VALUE: 30
PIF_VALUE: 32
PIF_VALUE: 25
PIF_VALUE: 30
PIF_VALUE: 32
PIF_VALUE: 23
PIF_VALUE: 23
PIF_VALUE: 33
PIF_VALUE: 14
PIF_VALUE: 1
PIF_VALUE: 1
PIF_VALUE: 30
PIF_VALUE: 33
PIF_VALUE: 32
PIF_VALUE: 33
PIF_VALUE: 24
PIF_VALUE: 22
PIF_VALUE: 33
PIF_VALUE: 33
PIF_VALUE: 5
PIF_VALUE: 4
PIF_VALUE: 33
PIF_VALUE: 33
PIF_VALUE: 32
PIF_VALUE: 26
PIF_VALUE: 33
PIF_VALUE: 32
PIF_VALUE: 30
PIF_VALUE: 29
PIF_VALUE: 29
PIF_VALUE: 33
PIF_VALUE: 30
PIF_VALUE: 34
PIF_VALUE: 34
PIF_VALUE: 29
PIF_VALUE: 30
PIF_VALUE: 32
PIF_VALUE: 24
PIF_VALUE: 33
PIF_VALUE: 33
PIF_VALUE: 34
PIF_VALUE: 30
PIF_VALUE: 33
PIF_VALUE: 23
PIF_VALUE: 25
PIF_VALUE: 32
PIF_VALUE: 29
PIF_VALUE: 33
PIF_VALUE: 33
PIF_VALUE: 32
PIF_VALUE: 20
PIF_VALUE: 21
PIF_VALUE: 30
PIF_VALUE: 31
PIF_VALUE: 33

## 2022-04-18 ASSESSMENT — LIFESTYLE VARIABLES: SMOKING_STATUS: 0

## 2022-04-18 ASSESSMENT — PAIN SCALES - GENERAL
PAINLEVEL_OUTOF10: 0

## 2022-04-18 ASSESSMENT — PAIN - FUNCTIONAL ASSESSMENT: PAIN_FUNCTIONAL_ASSESSMENT: 0-10

## 2022-04-18 ASSESSMENT — PAIN DESCRIPTION - PROGRESSION: CLINICAL_PROGRESSION: NOT CHANGED

## 2022-04-18 NOTE — H&P
[facility]) Physicians   General & Laparoscopic Surgery  [employer]        HPI:     Colleen Escamilla is a very pleasant 54 y.o. female with Body mass index is 44.71 kg/m² here for sleeve gastrectomy        Past Medical History        Past Medical History:   Diagnosis Date    Cancer ([address]       skin    Depression      Migraine      Pre-operative clearance           Past Surgical History         Past Surgical History:   Procedure Laterality Date    BREAST ENHANCEMENT SURGERY        COLONOSCOPY N/A 2/14/2022     COLONOSCOPY performed by Aleksander Streeter MD at [address] [facility] [address] SKIN TAG REMOVAL         cancer    UPPER GASTROINTESTINAL ENDOSCOPY N/A 2/14/2022     EGD BIOPSY performed by Juliano Ring DO at [address] [facility]  Family History         Family History   Problem Relation Age of Onset    Breast Cancer Mother      Cancer Father           pancreatic    Cancer Maternal Grandmother           lung         Social History           Tobacco Use    Smoking status: Never Smoker    Smokeless tobacco: Never Used   Substance Use Topics    Alcohol use: Not Currently      I counseled the patient on the importance of not smoking and risks of ETOH.         Allergies   Allergen Reactions    Prochlorperazine Hives and Other (See Comments)      Vitals       Vitals:     03/03/22 0734   Weight: 277 lb (125.6 kg)   Height: 5' 6\" (1.676 m)            Body mass index is 44.71 kg/m².       Current Medication      Current Outpatient Medications:     lamoTRIgine (LAMICTAL) 150 MG tablet, , Disp: , Rfl:     pantoprazole (PROTONIX) 20 MG tablet, Take 20 mg by mouth daily, Disp: , Rfl:     sertraline (ZOLOFT) 100 MG tablet, Take 100 mg by mouth 2 times daily, Disp: , Rfl:            Review of Systems - History obtained from the patient  General ROS: negative  Psychological ROS: negative  Endocrine ROS: negative  Respiratory ROS: negative  Cardiovascular ROS: negative  Gastrointestinal ROS:negative  Genito-Urinary ROS: negative  Musculoskeletal ROS: negative   Skin ROS: negative     Physical Exam   Vitals Reviewed   Constitutional: Patient is oriented to person, place, and time. Patient appears well-developed and well-nourished. Patient is active and cooperative. Non-toxic appearance. No distress. Neck: Trachea normal and normal range of motion. No JVD present. Pulmonary/Chest: Effort normal. No accessory muscle usage or stridor. No apnea. No respiratory distress. Cardiovascular: Normal rate and no JVD. Abdominal: Normal appearance. Patient exhibits no distension. Abdomen is soft, obese, non tender. Musculoskeletal: Normal range of motion. Patient exhibits no edema. Neurological: Patient is alert and oriented to person, place, and time. Patient has normal strength. GCS eye subscore is 4. GCS verbal subscore is 5. GCS motor subscore is 6. Skin: Skin is warm and dry. No abrasion and no rash noted. Patient is not diaphoretic. No cyanosis or erythema. Psychiatric: Patient has a normal mood and affect.  Speech is normal and behavior is normal. Cognition and memory are normal.         A/P     Grewal Servant is 54 y.o. female, Body mass index is 44.71 kg/m².      All risks, benefits, alternatives discussed    Informed consent obtained    Will proceed with sleeve gastrectomy and all indicated procedures    Layo Lazo

## 2022-04-18 NOTE — ANESTHESIA POSTPROCEDURE EVALUATION
Department of Anesthesiology  Postprocedure Note    Patient: Mariah Hernandez  MRN: [id number]  YOB: 1970  Date of evaluation: 4/18/2022  Time:  1:40 PM     Procedure Summary     Date: 04/18/22 Room / Location: [address]    Anesthesia Start: 1006 Anesthesia Stop: 5767    Procedure: ROBOTIC ASSISTED LAPAROSCOPIC SLEEVE GASTRECTOMY SLEEVE LAPAROSCOPIC ROBOTIC XI, ROBOTIC HIATAL HERNIA REPAIR (N/A Abdomen) Diagnosis:       (E66.01  MORBID OBESITY)      (K21.9 GASTROESOPHAGEAL REFLUX DISEASE)    Surgeons: Shadi Obando DO Responsible Provider: Marycarmen Larsen MD    Anesthesia Type: general ASA Status: 3          Anesthesia Type: general    Logan Phase I: Logan Score: 7    Logan Phase II:      Last vitals: Reviewed and per EMR flowsheets.        Anesthesia Post Evaluation    Patient location during evaluation: PACU  Patient participation: complete - patient participated  Level of consciousness: awake and alert  Airway patency: patent  Nausea & Vomiting: no nausea and no vomiting  Complications: no  Cardiovascular status: hemodynamically stable  Respiratory status: acceptable  Hydration status: stable  Multimodal analgesia pain management approach

## 2022-04-18 NOTE — CARE COORDINATION
Discharge Planning Note:    Chart reviewed and it appears that patient has minimal needs for discharge at this time. Discussed with patient and requested that case management be notified if discharge needs are identified.     - Current discharge plan is for the patient to return home with no needs. Case management will continue to follow progress and update discharge plan as needed.       Risk of Readmission Score:    GARRETT MesaN RN    [facility]  Phone: [phone]

## 2022-04-18 NOTE — ANESTHESIA PRE PROCEDURE
Department of Anesthesiology  Preprocedure Note       Name:  Nico Staton   Age:  54 y.o.  :  1970                                          MRN:  [id number]         Date:  2022      Surgeon: Marjorie Hart): Hattie Clark DO    Procedure: Procedure(s):  ROBOTIC ASSISTED LAPAROSCOPIC SLEEVE GASTRECTOMY SLEEVE LAPAROSCOPIC ROBOTIC XI    Medications prior to admission:   Prior to Admission medications    Medication Sig Start Date End Date Taking? Authorizing Provider   Eletriptan Hydrobromide (RELPAX PO) Take by mouth    Historical Provider, MD   lamoTRIgine (LAMICTAL) 150 MG tablet at bedtime  22   Historical Provider, MD   pantoprazole (PROTONIX) 20 MG tablet Take 20 mg by mouth daily    Historical Provider, MD   sertraline (ZOLOFT) 100 MG tablet Take 100 mg by mouth at bedtime  10/7/21   Historical Provider, MD       Current medications:    No current facility-administered medications for this visit. No current outpatient medications on file. Facility-Administered Medications Ordered in Other Visits   Medication Dose Route Frequency Provider Last Rate Last Admin    lactated ringers infusion   IntraVENous Continuous , DO        lidocaine PF 1 % injection 0.5 mL  0.5 mL IntraDERmal Once , DO        ceFAZolin (ANCEF) 3000 mg in dextrose 5 % 100 mL IVPB  3,000 mg IntraVENous On Call to [address] [place] [address], DO        scopolamine (TRANSDERM-SCOP) transdermal patch 1 patch  1 patch TransDERmal Once , DO        enoxaparin (LOVENOX) injection 30 mg  30 mg SubCUTAneous Once , DO        aprepitant (EMEND) capsule 80 mg  80 mg Oral Once , DO        pregabalin (LYRICA) capsule 150 mg  150 mg Oral Once , DO        acetaminophen (TYLENOL) 160 MG/5ML solution 650 mg  650 mg Oral Once , DO           Allergies:     Allergies   Allergen Reactions    Prochlorperazine Hives and Other (See Comments)     compazine       Problem List: Patient Active Problem List   Diagnosis Code    Gastroesophageal reflux disease without esophagitis K21.9    Morbid obesity with BMI of 40.0-44.9, adult ([facility] E66.01, Z68.41       Past Medical History:        Diagnosis Date    Cancer ([facility]     skin    Depression     Migraine     Pre-operative clearance        Past Surgical History:        Procedure Laterality Date    BREAST ENHANCEMENT SURGERY      COLONOSCOPY N/A 2/14/2022    COLONOSCOPY performed by Debbie Price MD at [facility] [address] SKIN TAG REMOVAL      cancer    UPPER GASTROINTESTINAL ENDOSCOPY N/A 2/14/2022    EGD BIOPSY performed by Gaye Bloch, DO at [facility] ENDOSCOPY       Social History:    Social History     Tobacco Use    Smoking status: Never Smoker    Smokeless tobacco: Never Used   Substance Use Topics    Alcohol use: Not Currently                                Counseling given: Not Answered      Vital Signs (Current): There were no vitals filed for this visit. BP Readings from Last 3 Encounters:   02/14/22 102/74   02/14/22 107/69   11/03/21 119/80       NPO Status:                                                                                 BMI:   Wt Readings from Last 3 Encounters:   03/25/22 280 lb (127 kg)   03/22/22 280 lb 9.6 oz (127.3 kg)   03/03/22 277 lb (125.6 kg)     There is no height or weight on file to calculate BMI.    CBC: No results found for: WBC, RBC, HGB, HCT, MCV, RDW, PLT    CMP: No results found for: NA, K, CL, CO2, BUN, CREATININE, GFRAA, AGRATIO, LABGLOM, GLUCOSE, GLU, PROT, CALCIUM, BILITOT, ALKPHOS, AST, ALT    POC Tests: No results for input(s): POCGLU, POCNA, POCK, POCCL, POCBUN, POCHEMO, POCHCT in the last 72 hours.     Coags: No results found for: PROTIME, INR, APTT    HCG (If Applicable):   Lab Results   Component Value Date    PREGTESTUR Negative 02/14/2022        ABGs: No results found for: PHART, PO2ART, EFR4IUW, TNC6EMN, BEART, U6RPQIYG     Type & Screen (If Applicable):  No results found for: LABABO, LABRH    Drug/Infectious Status (If Applicable):  No results found for: HIV, HEPCAB    COVID-19 Screening (If Applicable): No results found for: COVID19        Anesthesia Evaluation    Airway: Mallampati: II  TM distance: >3 FB   Neck ROM: full  Mouth opening: > = 3 FB Dental: normal exam         Pulmonary: breath sounds clear to auscultation      (-) COPD, asthma and not a current smoker                           Cardiovascular:        (-) hypertension, past MI, CAD, CABG/stent, dysrhythmias,  angina,  CHF and orthopnea      Rhythm: regular                      Neuro/Psych:   (+) headaches: migraine headaches, psychiatric history:            GI/Hepatic/Renal:   (+) GERD:, morbid obesity     (-) hepatitis and liver disease       Endo/Other:    (+) no malignancy/cancer. (-) diabetes mellitus, hypothyroidism, hyperthyroidism, no malignancy/cancer               Abdominal:             Vascular:     - DVT and PE. Other Findings:               Anesthesia Plan      general     ASA 3       Induction: intravenous. Anesthetic plan and risks discussed with patient. Plan discussed with CRNA.                   Perla Huang MD   4/18/2022

## 2022-04-18 NOTE — OP NOTE
[facility]) Physicians   [employer]              Procedure Note    Indications: The patient was evaluated by our multidisciplinary team and was found to be a good candidate for weight loss surgery. Body mass index is 43.42 kg/m². Pre-operative Diagnosis:   Patient Active Problem List   Diagnosis    Gastroesophageal reflux disease without esophagitis    Morbid obesity with BMI of 40.0-44.9, adult ([address]         Post-operative Diagnosis:   Same      Procedure:    - Robotic Sleeve Gastrectomy  - Robotic Hiatal Hernia Repair      Surgeon: Hannah Feldman DO    Anesthesia: General endotracheal anesthesia        Procedure Details   The patient was seen again in the Holding Room. The risks, benefits, complications, treatment options, and expected outcomes were discussed with the patient and/or family. The possibilities of reaction to medication, pulmonary aspiration, perforation of viscus, bleeding, recurrent infection, strictures, leaks, failure to lose weight, regaining weight,  the need for additional procedures, failure to diagnose a condition, and creating a complication requiring transfusion or operation were discussed. There was concurrence with the proposed plan and informed consent was obtained. The site of surgery was properly noted/marked. The patient was taken to Operating Room, identified as Geovanni Nuno and the procedure verified as Laparoscopic Sleeve Gastrectomy. A Time Out was held and the above information confirmed. The patient was placed in the supine position and general anesthesia was induced, along with placement of orogastric tube and SCD's. Lovenox SQ given pre-operatively. IV Antibiotics given pre-operatively. All pressure points were padded properly.      A left upper quadrant incision was made and a veres needle was inserted after confirming with saline drop test.  After adequate pneumoperitoneum was obtained, a 5 mm trocar was inserted under direct vision and there was no injury on initial trocar placement. Additional ports were placed in the standard positions under direct vision. The abdomen was initially explored and no abnormalities were identified. A liver retractor was inserted through a small incision in the upper midline, lifted the liver upward, and was then secured to the OR table. The pylorus was identified and measurement was taken approximately 4 cm from the pylorus along the greater curvature of the stomach. The vessel sealer was used to take down the attachments and short gastric vessels along the greater curve of the stomach. This was continued until all attachments were taken down and continued to the gastro-esophageal junction. A 36 Turkish dilator was advanced along the lesser curvature and into the pylorus. A moderate sized hiatal hernia was identified and decision was made to repair this to help improve patient's GERD and ensure sleeve would not migrate into the chest.  Began the 360-degree dissection by dividing the pars flaccida and mobilizing the esophagus at the right yennifer, identifying the junction with the left yennifer lateral to the esophagus. I then divided the phrenoesophageal membrane and the esophagus was freed from both crura and hiatal hernia sac was completely dissected off of the distal esophagus. The dissection was done high into the mediastinum, identifying the aorta as well as the inferior pulmonary vein with complete reduction. The hiatal hernia sac was completely freed off of the left and right parietal pleura and excised. The esophagus was now mobilized at the hiatus and hernia sac was estimated to be about 3 cm. The crura was then closed posterior to the esophagus with multiple interrupted 0.0 ethibond. There was approximately 4 cm of Intraabdominal esophagus. Vagus nerves were protected and away from dissection. Made sure it was not too tight on the esophagus and the Boogie passed easily through it.     A stapler was fired along the dilator to create an appropriate sized gastric sleeve pouch. Green firing followed by blue firings were used to create the sleeve. The staple line looked very healthy and no bleeding from staple line. The stomach was confirmed to be completely divided with uniform shape,  no twist in the sleeve and wide patent incisura. A 2-0 PDS suture was used to over-sew and imbricate the sleeve staple line. The staple line was completely over-sewn over dilator. The stomach distal to the staple line was clamped and methylene blue saline was injected under pressure confirming no obstruction and no leak. The abdomen was carefully inspected and there was no bleeding or any other abnormality. The stomach was brought out through the RUQ incision. Hemostasis was confirmed. The 12 port sites was closed using 0.0 Vicryl  suture at the level of the fascia. The trocar site skin wounds were closed using 4.0 Vicryl after copious Irrigation of the wounds. Local Anesthetic used at port sites then Dermabond applied. Instrument, sponge, and needle counts were correct at the conclusion of the case. Findings: Morbid Obesity. Estimated Blood Loss:  Minimal           Drains: none           Specimens: Stomach (Subtotal)            Complications:  None; patient tolerated the procedure well. Disposition: PACU - hemodynamically stable. Condition: stable    Attending Attestation: I was present and scrubbed for the entire procedure.

## 2022-04-18 NOTE — PROGRESS NOTES
Capnography frequently alarming stating low respirations. respirations are > 12 p/min when counted manually at bedside. Attempted to change out capnography set with no effect. Contacted RT to place bedside pulse ox and placed pt on wall mounted pulse ox for now. (bedside alarm is much louder than wall mount for pt safety) after several minutes with pt sleeping o2 sat remains > 96% on 2 liters o2. Will continue to monitor.

## 2022-04-18 NOTE — PROGRESS NOTES
Nursing care provided to prep patient for surgery. Patient lost 11 lbs. on pre-op diet, informed surgeon. Pre-op orders completed. Bilateral foot pneumatic sleeves applied. Teaching / education initiated regarding perioperative experience, post-op expectations and plan of care, and pain management during hospital stay. Patient verbalized understanding. The care plan and education has been reviewed and mutually agreed upon with the patient.

## 2022-04-19 VITALS
WEIGHT: 269 LBS | RESPIRATION RATE: 16 BRPM | TEMPERATURE: 98.1 F | HEART RATE: 80 BPM | DIASTOLIC BLOOD PRESSURE: 74 MMHG | BODY MASS INDEX: 43.23 KG/M2 | SYSTOLIC BLOOD PRESSURE: 110 MMHG | OXYGEN SATURATION: 96 % | HEIGHT: 66 IN

## 2022-04-19 LAB
ANION GAP SERPL CALCULATED.3IONS-SCNC: 10 MMOL/L (ref 3–16)
BUN BLDV-MCNC: 11 MG/DL (ref 7–20)
CALCIUM SERPL-MCNC: 8.7 MG/DL (ref 8.3–10.6)
CHLORIDE BLD-SCNC: 102 MMOL/L (ref 99–110)
CO2: 25 MMOL/L (ref 21–32)
CREAT SERPL-MCNC: 0.8 MG/DL (ref 0.6–1.1)
GFR AFRICAN AMERICAN: >60
GFR NON-AFRICAN AMERICAN: >60
GLUCOSE BLD-MCNC: 80 MG/DL (ref 70–99)
HCT VFR BLD CALC: 39.6 % (ref 36–48)
HEMOGLOBIN: 13 G/DL (ref 12–16)
MCH RBC QN AUTO: 28.7 PG (ref 26–34)
MCHC RBC AUTO-ENTMCNC: 32.9 G/DL (ref 31–36)
MCV RBC AUTO: 87.1 FL (ref 80–100)
PDW BLD-RTO: 14.7 % (ref 12.4–15.4)
PLATELET # BLD: 300 K/UL (ref 135–450)
PMV BLD AUTO: 9 FL (ref 5–10.5)
POTASSIUM SERPL-SCNC: 4.8 MMOL/L (ref 3.5–5.1)
RBC # BLD: 4.54 M/UL (ref 4–5.2)
SODIUM BLD-SCNC: 137 MMOL/L (ref 136–145)
WBC # BLD: 8.9 K/UL (ref 4–11)

## 2022-04-19 PROCEDURE — 94760 N-INVAS EAR/PLS OXIMETRY 1: CPT

## 2022-04-19 PROCEDURE — APPSS180 APP SPLIT SHARED TIME > 60 MINUTES: Performed by: NURSE PRACTITIONER

## 2022-04-19 PROCEDURE — 6370000000 HC RX 637 (ALT 250 FOR IP): Performed by: NURSE PRACTITIONER

## 2022-04-19 PROCEDURE — 36415 COLL VENOUS BLD VENIPUNCTURE: CPT

## 2022-04-19 PROCEDURE — 99024 POSTOP FOLLOW-UP VISIT: CPT | Performed by: NURSE PRACTITIONER

## 2022-04-19 PROCEDURE — 2580000003 HC RX 258: Performed by: SURGERY

## 2022-04-19 PROCEDURE — 6360000002 HC RX W HCPCS: Performed by: SURGERY

## 2022-04-19 PROCEDURE — 80048 BASIC METABOLIC PNL TOTAL CA: CPT

## 2022-04-19 PROCEDURE — C9113 INJ PANTOPRAZOLE SODIUM, VIA: HCPCS | Performed by: SURGERY

## 2022-04-19 PROCEDURE — 85027 COMPLETE CBC AUTOMATED: CPT

## 2022-04-19 RX ORDER — ONDANSETRON 8 MG/1
8 TABLET, ORALLY DISINTEGRATING ORAL EVERY 8 HOURS PRN
Status: DISCONTINUED | OUTPATIENT
Start: 2022-04-19 | End: 2022-04-19 | Stop reason: HOSPADM

## 2022-04-19 RX ORDER — OXYCODONE HYDROCHLORIDE AND ACETAMINOPHEN 5; 325 MG/1; MG/1
2 TABLET ORAL EVERY 4 HOURS PRN
Status: DISCONTINUED | OUTPATIENT
Start: 2022-04-19 | End: 2022-04-19 | Stop reason: HOSPADM

## 2022-04-19 RX ORDER — ONDANSETRON 4 MG/1
8 TABLET, ORALLY DISINTEGRATING ORAL EVERY 8 HOURS PRN
Qty: 30 TABLET | Refills: 2 | Status: SHIPPED | OUTPATIENT
Start: 2022-04-19 | End: 2022-10-20 | Stop reason: ALTCHOICE

## 2022-04-19 RX ORDER — ONDANSETRON 4 MG/1
8 TABLET, ORALLY DISINTEGRATING ORAL EVERY 8 HOURS PRN
Qty: 30 TABLET | Refills: 0 | Status: SHIPPED | OUTPATIENT
Start: 2022-04-19 | End: 2022-10-20 | Stop reason: ALTCHOICE

## 2022-04-19 RX ORDER — OXYCODONE HYDROCHLORIDE AND ACETAMINOPHEN 5; 325 MG/1; MG/1
1 TABLET ORAL EVERY 4 HOURS PRN
Status: DISCONTINUED | OUTPATIENT
Start: 2022-04-19 | End: 2022-04-19 | Stop reason: HOSPADM

## 2022-04-19 RX ORDER — OXYCODONE HYDROCHLORIDE AND ACETAMINOPHEN 5; 325 MG/1; MG/1
1 TABLET ORAL EVERY 6 HOURS PRN
Qty: 28 TABLET | Refills: 0 | Status: SHIPPED | OUTPATIENT
Start: 2022-04-19 | End: 2022-04-26

## 2022-04-19 RX ADMIN — OXYCODONE AND ACETAMINOPHEN 1 TABLET: 5; 325 TABLET ORAL at 08:16

## 2022-04-19 RX ADMIN — OXYCODONE AND ACETAMINOPHEN 2 TABLET: 5; 325 TABLET ORAL at 14:05

## 2022-04-19 RX ADMIN — SODIUM CHLORIDE: 9 INJECTION, SOLUTION INTRAVENOUS at 02:03

## 2022-04-19 RX ADMIN — OXYCODONE AND ACETAMINOPHEN 1 TABLET: 5; 325 TABLET ORAL at 09:00

## 2022-04-19 RX ADMIN — Medication 10 MG: at 06:22

## 2022-04-19 RX ADMIN — ENOXAPARIN SODIUM 40 MG: 40 INJECTION SUBCUTANEOUS at 07:56

## 2022-04-19 RX ADMIN — PANTOPRAZOLE SODIUM 40 MG: 40 INJECTION, POWDER, FOR SOLUTION INTRAVENOUS at 07:56

## 2022-04-19 RX ADMIN — ONDANSETRON 4 MG: 2 INJECTION INTRAMUSCULAR; INTRAVENOUS at 13:30

## 2022-04-19 ASSESSMENT — PAIN DESCRIPTION - PROGRESSION
CLINICAL_PROGRESSION: NOT CHANGED

## 2022-04-19 ASSESSMENT — PAIN SCALES - GENERAL
PAINLEVEL_OUTOF10: 7
PAINLEVEL_OUTOF10: 7
PAINLEVEL_OUTOF10: 3
PAINLEVEL_OUTOF10: 6
PAINLEVEL_OUTOF10: 6

## 2022-04-19 NOTE — PROGRESS NOTES
Gave 4 mg Zofran IVP for c/o new nausea. Reviewed IS usage and pt demonstrated proper technique. Doing well with PO intake, states she will call out for additional pain medication once nausea lessens. Has been out in the halls ambulating laps on 3 separate occassions today.

## 2022-04-19 NOTE — PLAN OF CARE
Problem: Pain:  Goal: Pain level will decrease  Description: Pain level will decrease  Outcome: Ongoing  Goal: Control of acute pain  Description: Control of acute pain  Outcome: Ongoing  Goal: Control of chronic pain  Description: Control of chronic pain  Outcome: Ongoing    Pain assessed using 0-10 scale, patient able to voice pain level and states pain improved with prn medication administered. Fall precautions in place, hourly rounding, call light and belongings in reach, bed in lowest position, wheels locked in place, side rails up x 2, walkways free of clutter    Skin remains dry and intact, no signs of breakdown noted. Patient encouraged to reposition every 2 hours and is assisted to do so when unable to reposition independently.      Pt shows signs of wound healing no s/s of infection present including: fever, foul smelling drainage, redness, increased soreness, or increased swelling in any area, including surgical wound

## 2022-04-19 NOTE — PROGRESS NOTES
[facility]) Physicians   General & Laparoscopic Surgery  [employer]       Pt seen and examined    S/p robotic sleeve gastrectomy & hiatal hernia repair. The patient is ambulating in sanchez. Pain is well controlled. There is no nausea and/or vomiting. There are no other complaints or questions. Vitals:    04/19/22 0030 04/19/22 0230 04/19/22 0453 04/19/22 0615   BP: 119/76 106/70 101/65 92/63   Pulse: 71 62 60 58   Resp: 16 16 16 16   Temp: 97.6 °F (36.4 °C)      TempSrc: Oral Oral     SpO2: 96% 94% 94% 96%   Weight:       Height:         Abdomen is soft, appropriately tender, incisions stable with no erythema. Breath sounds are clear bilaterally. Bowel sounds are hypoactive in all quadrants.     Data  CBC:   Lab Results   Component Value Date    WBC 8.9 04/19/2022    RBC 4.54 04/19/2022    HGB 13.0 04/19/2022    HCT 39.6 04/19/2022    MCV 87.1 04/19/2022    MCH 28.7 04/19/2022    MCHC 32.9 04/19/2022    RDW 14.7 04/19/2022     04/19/2022    MPV 9.0 04/19/2022     BMP:    Lab Results   Component Value Date     04/19/2022    K 4.8 04/19/2022     04/19/2022    CO2 25 04/19/2022    BUN 11 04/19/2022    CREATININE 0.8 04/19/2022    CALCIUM 8.7 04/19/2022    GFRAA >60 04/19/2022    LABGLOM >60 04/19/2022    GLUCOSE 80 04/19/2022     Current Inpatient Medications    Current Facility-Administered Medications: lactated ringers infusion, , IntraVENous, Continuous  scopolamine (TRANSDERM-SCOP) transdermal patch 1 patch, 1 patch, TransDERmal, Once  0.9 % sodium chloride infusion, , IntraVENous, Continuous  sodium chloride flush 0.9 % injection 5-40 mL, 5-40 mL, IntraVENous, 2 times per day  sodium chloride flush 0.9 % injection 5-40 mL, 5-40 mL, IntraVENous, PRN  0.9 % sodium chloride infusion, 25 mL, IntraVENous, PRN  ondansetron (ZOFRAN) injection 4 mg, 4 mg, IntraVENous, Q6H PRN  scopolamine (TRANSDERM-SCOP) transdermal patch 1 patch, 1 patch, TransDERmal, Q72H  metoclopramide (REGLAN) injection 10 mg, 10 mg, IntraVENous, Q6H PRN  enoxaparin (LOVENOX) injection 40 mg, 40 mg, SubCUTAneous, 2 times per day  naloxone (NARCAN) injection 0.4 mg, 0.4 mg, IntraVENous, PRN  HYDROmorphone HCl (DILAUDID) 0.2 mg/mL PCA 50 mL, , IntraVENous, Continuous  hyoscyamine (LEVSIN) 500 MCG/ML injection 250 mcg, 250 mcg, IntraVENous, Q4H PRN  enalaprilat (VASOTEC) injection 1.25 mg, 1.25 mg, IntraVENous, Q6H PRN  pantoprazole (PROTONIX) injection 40 mg, 40 mg, IntraVENous, Daily  methocarbamol (ROBAXIN) 500 mg in dextrose 5 % 100 mL IVPB, 500 mg, IntraVENous, Q8H    Patient Active Problem List   Diagnosis    Gastroesophageal reflux disease without esophagitis    Morbid obesity with BMI of 40.0-44.9, adult ([facility])    S/P Robotic sleeve gastrectomy     A/P  Erwin Carpenter is a 54 y.o. female pod#1, s/p robotic sleeve gastrectomy & hiatal hernia repair. Stable overall. UGI with no leak/obstruction, will start on Phase I diet. Patient has voided postoperatively and urine output is WNL. Will continue to monitor. Will discontinue PCA and start oral pain medications. Patient will continue to wear abdominal binder when walking for support. Patient needs to ambulate in the sanchez every 60-90 minutes. Patient needs to utilize incentive spirometer 10 times per hour while awake. Patient will continue icing incisions. Plan for discharge today if nausea remains controlled, patient continues to void and is tolerating Phase I diet. Discussed with Dr. Aruna Meeks and Nursing Staff.     IAN Hughes

## 2022-04-19 NOTE — PROGRESS NOTES
CLINICAL PHARMACY NOTE: MEDS TO BEDS    Total # of Prescriptions Filled: 2   The following medications were delivered to the patient:  · Percocet 5-325 mg  · Zofran 8 mg    Additional Documentation:    Delivered to Patient=Signed  Ok to be delivered per Jalen Salomon 2 pm  Zulma Rodriguez CPhT

## 2022-04-19 NOTE — PROGRESS NOTES
Pt resting awake in bed. Has started bariatric clears per NP aYir. Gave 5 mg percocet crushed in water. Will eval BP and pain level in 1 hour. Pt reports she ambulated in halls overnight. Discussed indication for progressive ambulation in halls today. Pt verbalized understanding. Lap sites are WDL. Ice packs in place to abdomen.  Pt is axox4 and able to answer questions and follow commands throughout assessment

## 2022-04-19 NOTE — DISCHARGE SUMMARY
The patient was admitted on day of surgery and underwent a robotic sleeve gastrectomy & hiatal hernia repair. Surgery went well and the patient went to our post-op bariatric floor. Overnight, the patient had stable vitals signs, good urine output and ambulated in the halls. On POD #1 the patient underwent an UGI which revealed no leak or obstruction. Phase I diet was started and the patient tolerated well. The patient has no N/V, tolerating diet, ambulating and pain controlled on oral medication. The patient was discharged home today in stable condition. The patient will f/u in 2 weeks and was given dietary and ambulation instruction. The patient was instructed to call if there are any questions or concerns.      Patient Active Problem List   Diagnosis    Gastroesophageal reflux disease without esophagitis    Morbid obesity with BMI of 40.0-44.9, adult ([facility]    S/P Robotic sleeve gastrectomy

## 2022-04-19 NOTE — PROGRESS NOTES
Shift assessment completed, see flowsheet Medications given per MAR. Patient is Alert and oriented x4. In bed resting with eyes closed. Lap sites to abdomen remains clean dry and intact. Cold applied. Assisted to RR x1, voiding well. Ambulated in sanchez, Pt expressed no other needs at this time. Will continue with orders/nursing care. Fall precautions in place, hourly rounding, call light and belongings in reach, bed in lowest position, wheels locked in place, side rails up x 2, walkways free of clutter  The care plan and education has been reviewed and mutually agreed upon with the patient.

## 2022-04-25 ENCOUNTER — TELEPHONE (OUTPATIENT)
Dept: BARIATRICS/WEIGHT MGMT | Age: 52
End: 2022-04-25

## 2022-04-25 NOTE — TELEPHONE ENCOUNTER
The patient was contacted to follow up on their recent bariatric surgery. The following topics were reviewed:    [] Hydration is Adequate- over 30 but not close to [address] / gatorade zero            --Patient is getting at least 48-64 oz of fluids a day, not including protein shakes. []Consuming Adequate Protein- attempting, usually ~1 per day         []Consuming 2 protein shakes a day                []Consuming 60-80 grams of protein a day    [] Food intake is appropriate- applesauce / sf pudding  []Adequately pureeing foods, so that there are no chunks left. []Taking in 1-2 oz at a time  [] Eating 4-6 times a day  [] Following the 30-30-30 rule  [] Reminded patient to keep food diary to bring to their 2 week follow up appointment. [] Pain relief techniques utilized  [] Taking pain medication as prescribed- stopped taking d/t taste   [] Utilizing Lidoderm patches (if prescribed)  []Taking Tylenol instead of prescription pain medication- discussed as an option  [x] Wearing abdominal binder  [x] Using ice for incisional pain    [x] Activity is appropriate  [x] Walking 10 minutes out of every hour  [x]Avoiding heavy lifting (>10lbs)  [] Utilizing their incentive spirometer- ~3x/day    [] Issues with Nausea/Vomiting/Reflux- none  [] Using Zofran PRN for nausea/vomiting   []Taking Prilosec for reflux    [] Issues with Constipation- a little but was finally, able to go   [x]Tried Colace  []Tried Miralax    All questions and concerns addressed. Pt is really struggling with fluids. States it hurts for about 3 min after she drinks anything. Pt does not believe it is cramping but just painful. Suspect pt is drinking too quickly / too much at a time. Reviewed sipping guidelines and techniques to control intake. Advised pt to only focus on fluids over the next 24-48oz, aiming for 48oz. Did discuss using protein serna. RD to f/u with pt regarding progress on Tues/Wed.   Once fluid intake is established pt may add in protein shakes (2 per day), then pureed food. Pt voiced understanding. Patient was asked to please fill out hospital survey if she receives one in the mail.

## 2022-04-25 NOTE — TELEPHONE ENCOUNTER
Surgery Type: sleeve w/ hiatal hernia repair    Surgery Date: 4/18/22    Surgeon: Dr. Alexandra Mc    I attempted to contact the patient to follow up with them regarding their recent surgery. I have left a voicemail for the patient to return our call. If I am not in office when she returns my call, please have them speak with a dietician. Thanks!

## 2022-04-27 NOTE — TELEPHONE ENCOUNTER
Spoke to patient  All questions answered  Will focus on continuing to increase fluids to 48+ by today and incorporate clear protein as an adjunct to get to protein goal

## 2022-04-27 NOTE — TELEPHONE ENCOUNTER
RD spoke with pt. States yesterday she drank 40 oz of water and gatorade zero, did not drink any protein shakes, and ate 2 oz of yogurt once yesterday. Pt was unable to find protein serna in the store, but after discussing pt thinks they might have looked in wrong section. Reviewed she could try [id number], premier protein clear, gatorade zero with protein. Discussed ways of finding such as reviewing availability at store, checking medical section by protein shakes/bars, ordering online, etc. Discussed she can count those towards both fluids and protein. Discussed importance of avoiding pureed food until able to meet fluid/shake goals. Pt does report she has been more mindful of sips and feels drinking is somewhat easier. Discussed next goal (as tolerated) of trying to consume 16 oz within 90 minutes as a gauge. Encouraged pt to focus on protein shakes 2nd after meeting fluid goal. Pt reports struggling with shakes as they are too much - pt mixing 1 scoop with 8 oz skim milk. Informed her she could drop to 6 oz OR break shakes down into 4 oz servings as she will reach protein goal faster with shakes vs. food. Pt verbalized understanding. Pt reports still in a lot of pain, more than she anticipated. Pt with concern regarding pink welts/red knots under three incisions and incisions are itching - wanting to know if this is normal/anything she needs to do. Informed pt I would provide update to Provider and relay concerns regarding incisions and she verbalized understanding.

## 2022-04-29 ENCOUNTER — TELEPHONE (OUTPATIENT)
Dept: BARIATRICS/WEIGHT MGMT | Age: 52
End: 2022-04-29

## 2022-04-29 DIAGNOSIS — Z98.84 S/P LAPAROSCOPIC SLEEVE GASTRECTOMY: Primary | ICD-10-CM

## 2022-04-29 DIAGNOSIS — E86.0 DEHYDRATION: ICD-10-CM

## 2022-04-29 RX ORDER — HEPARIN SODIUM (PORCINE) LOCK FLUSH IV SOLN 100 UNIT/ML 100 UNIT/ML
500 SOLUTION INTRAVENOUS PRN
OUTPATIENT
Start: 2022-04-30

## 2022-04-29 RX ORDER — SODIUM CHLORIDE 9 MG/ML
5-250 INJECTION, SOLUTION INTRAVENOUS PRN
OUTPATIENT
Start: 2022-04-30

## 2022-04-29 RX ORDER — SODIUM CHLORIDE 0.9 % (FLUSH) 0.9 %
5-40 SYRINGE (ML) INJECTION PRN
OUTPATIENT
Start: 2022-04-30

## 2022-04-29 RX ORDER — HYOSCYAMINE SULFATE 0.12 MG/1
0.12 TABLET SUBLINGUAL EVERY 6 HOURS PRN
Qty: 30 EACH | Refills: 0 | Status: SHIPPED | OUTPATIENT
Start: 2022-04-29 | End: 2022-10-20 | Stop reason: ALTCHOICE

## 2022-04-29 NOTE — TELEPHONE ENCOUNTER
Provider spoke to pt,  pt is not traveling to [place]. Placed order for Levsin. Also setting pt up for IVF,  lvm for pt to call office to see if she is able to make to infusion today. [facility] stated if she is there by 100pm 4/29/22  then they can get her in, if not would have to be another day.

## 2022-04-29 NOTE — TELEPHONE ENCOUNTER
Pt is post-op sleeve 4/18/22. States she is still really struggling with fluids, having pain upon drinking. Pt c/o low energy. Concerned, leaving for [place] tomorrow, discussed generally travel is not recommended this close to surgery. Pt drinking ~32oz of water daily. Pt was able to get 2 - 8oz protein shakes in yesterday. Minimal to no pureed foods, did try scrambled eggs, chewed thoroughly, stated it was uncomfortable. Reiterated/reviewed pureed guidelines and importance. Pt is planning to take protein shakes to [place]. Pt feels she is following sipping guidelines. Reiterated them and advised setting a timer to consistently sip on fluids to meet needs. Pt states urine output is a little dark but normal quantity. She is up walking and using IS. Pt states she has experienced light headiness / dizziness but none the past 2 days. No fever or n/v.  Pt advised to focus on fluids first, goal 48oz daily then protein shakes which she can try making w/ 6oz of fluid.

## 2022-04-30 ENCOUNTER — TELEPHONE (OUTPATIENT)
Dept: ONCOLOGY | Age: 52
End: 2022-04-30

## 2022-04-30 ENCOUNTER — HOSPITAL ENCOUNTER (OUTPATIENT)
Dept: ONCOLOGY | Age: 52
Setting detail: INFUSION SERIES
Discharge: HOME | End: 2022-04-30
Payer: COMMERCIAL

## 2022-04-30 VITALS
TEMPERATURE: 98 F | DIASTOLIC BLOOD PRESSURE: 84 MMHG | OXYGEN SATURATION: 95 % | HEART RATE: 71 BPM | SYSTOLIC BLOOD PRESSURE: 126 MMHG | RESPIRATION RATE: 22 BRPM

## 2022-04-30 DIAGNOSIS — E86.0 DEHYDRATION: Primary | ICD-10-CM

## 2022-04-30 PROCEDURE — 2500000003 HC RX 250 WO HCPCS: Performed by: SURGERY

## 2022-04-30 PROCEDURE — 6360000002 HC RX W HCPCS: Performed by: SURGERY

## 2022-04-30 PROCEDURE — 96365 THER/PROPH/DIAG IV INF INIT: CPT

## 2022-04-30 PROCEDURE — 96361 HYDRATE IV INFUSION ADD-ON: CPT

## 2022-04-30 PROCEDURE — 2580000003 HC RX 258: Performed by: SURGERY

## 2022-04-30 PROCEDURE — 96360 HYDRATION IV INFUSION INIT: CPT

## 2022-04-30 RX ORDER — 0.9 % SODIUM CHLORIDE 0.9 %
1000 INTRAVENOUS SOLUTION INTRAVENOUS ONCE
Status: DISCONTINUED | OUTPATIENT
Start: 2022-04-30 | End: 2022-04-30 | Stop reason: DRUGHIGH

## 2022-04-30 RX ORDER — 0.9 % SODIUM CHLORIDE 0.9 %
2000 INTRAVENOUS SOLUTION INTRAVENOUS ONCE
Status: COMPLETED | OUTPATIENT
Start: 2022-04-30 | End: 2022-04-30

## 2022-04-30 RX ORDER — HEPARIN SODIUM (PORCINE) LOCK FLUSH IV SOLN 100 UNIT/ML 100 UNIT/ML
500 SOLUTION INTRAVENOUS PRN
OUTPATIENT
Start: 2022-04-30

## 2022-04-30 RX ORDER — 0.9 % SODIUM CHLORIDE 0.9 %
1000 INTRAVENOUS SOLUTION INTRAVENOUS ONCE
Status: CANCELLED | OUTPATIENT
Start: 2022-04-30 | End: 2022-04-30

## 2022-04-30 RX ORDER — SODIUM CHLORIDE 9 MG/ML
5-250 INJECTION, SOLUTION INTRAVENOUS PRN
OUTPATIENT
Start: 2022-04-30

## 2022-04-30 RX ORDER — SODIUM CHLORIDE 0.9 % (FLUSH) 0.9 %
5-40 SYRINGE (ML) INJECTION PRN
OUTPATIENT
Start: 2022-04-30

## 2022-04-30 RX ADMIN — SODIUM CHLORIDE 1000 ML: 9 INJECTION, SOLUTION INTRAVENOUS at 09:35

## 2022-04-30 RX ADMIN — THIAMINE HYDROCHLORIDE: 100 INJECTION, SOLUTION INTRAMUSCULAR; INTRAVENOUS at 10:53

## 2022-04-30 RX ADMIN — SODIUM CHLORIDE 2000 ML: 9 INJECTION, SOLUTION INTRAVENOUS at 10:13

## 2022-04-30 ASSESSMENT — PAIN SCALES - GENERAL: PAINLEVEL_OUTOF10: 0

## 2022-04-30 NOTE — TELEPHONE ENCOUNTER
Dr. Jesús Jones was contacted to clarify orders for Ms. Ibarra. He clarified that she is to receive 2 liters over 3 hours and a rally pack. Pt will follow up with her provider for further assistance.

## 2022-04-30 NOTE — PROGRESS NOTES
Pt seen and assessed in OPO today for 2 Liters normal saline infusion over 3 hours and Rally pack, 100 mg infusion over 1 hour per orders from Dr. Tosin Moreau. Vitals stable. Infused per [facility] policy. Monitoring completed for infusion reactions - see flowsheet. Pt tolerated infusion well and without incident. Pt verbalizes understanding of discharge instructions. Discharged ambulatory with her .      Electronically signed by Horton Najjar, RN on 4/30/2022 at 12:39 PM

## 2022-05-05 ENCOUNTER — OFFICE VISIT (OUTPATIENT)
Dept: BARIATRICS/WEIGHT MGMT | Age: 52
End: 2022-05-05

## 2022-05-05 VITALS
HEART RATE: 89 BPM | BODY MASS INDEX: 41.14 KG/M2 | DIASTOLIC BLOOD PRESSURE: 80 MMHG | HEIGHT: 66 IN | SYSTOLIC BLOOD PRESSURE: 115 MMHG | WEIGHT: 256 LBS

## 2022-05-05 DIAGNOSIS — Z98.84 S/P LAPAROSCOPIC SLEEVE GASTRECTOMY: Primary | ICD-10-CM

## 2022-05-05 PROCEDURE — 99024 POSTOP FOLLOW-UP VISIT: CPT | Performed by: SURGERY

## 2022-05-05 NOTE — PROGRESS NOTES
Dietary Assessment Note    Vitals:   Vitals:    05/05/22 0931   BP: 115/80   Pulse: 89   Weight: 256 lb (116.1 kg)   Height: 5' 6\" (1.676 m)    Patient lost 24 lbs over 6 weeks. Total Weight Loss: 27#    Labs reviewed: labs are reviewed, up to date and normal    Protein intake: 60-80 grams/day     Fluid intake: Still about 32 oz daily - not associated with pain anymore, pt just struggling to remember through the day and falls behind. Multivitamin/mineral intake: Fusion - how many/day: 1    Calcium intake: None    Other: None    Exercise: walking 15 minutes (a couple x/day)    Nutrition Assessment: 2 week post-op visit. Eating speed may be an issue - pt feels she has always been a quick eater. Protein shakes: 2 daily w/ 8 oz skim milk  Pureed foods: turkey breast, yogurt, applesauce  Frequency: 2 foods and 2 shakes = 4x total  Amount at a time: 2-3 oz  30-30-30: 15 minutes max, sometimes much faster. Pain with some foods and most likely d/t eating speed, less than adequate fluid intake and/or amount. Food Intolerances/issues: turkey breast depending on speed    Client Concerns: nausea, getting fluids in    Goals:   · Priority - get in 48-64 oz through the day. Reviewed and provided ideal meal process. Instructed pt to stop pureed foods today and tomorrow to prioritize fluids. Advance to phase 3 on 5/9 only if meeting fluid goal.  · Try taking Fusion MVI in different ways (2 at a time, chewing) before moving to capsule but provided alternative options if she can't tolerate chewable.     Plan: Follow up at 14 weeks post op      Rito Pickering, KATIE, LD

## 2022-05-05 NOTE — PROGRESS NOTES
[facility]) Physicians   General & Laparoscopic Surgery  [employer]       HPI:     Carmina Carson is a very pleasant 50 y.o. obese female , Body mass index is 41.32 kg/m². [place] And multiple medical problems who is presenting for bariatric follow up care. Carmina Carson is s/p laparoscopic sleeve gastrectomy by me   Comes today to the clinic without any complaints. Patient denies any nausea, vomiting, fevers, chills, shortness of breath, chest pain, constipation or urinary symptoms. Denies any heartburn nor dysphagia. Patient is feeling very well, and is very active. Patient is very pleased with the weight loss and resolution of co-morbid conditions. Past Medical History:   Diagnosis Date    Cancer ([address]     skin    Depression     Migraine     Pre-operative clearance      Past Surgical History:   Procedure Laterality Date    BREAST ENHANCEMENT SURGERY      COLONOSCOPY N/A 2/14/2022    COLONOSCOPY performed by Chandra Carnes MD at [address] SKIN TAG REMOVAL      cancer    SLEEVE GASTRECTOMY N/A 4/18/2022    ROBOTIC ASSISTED LAPAROSCOPIC SLEEVE GASTRECTOMY SLEEVE LAPAROSCOPIC ROBOTIC XI, ROBOTIC HIATAL HERNIA REPAIR performed by Layo Lazo DO at [address] [facility] ENDOSCOPY N/A 2/14/2022    EGD BIOPSY performed by Layo Lazo DO at [facility] ENDOSCOPY     Family History   Problem Relation Age of Onset    Breast Cancer Mother     Cancer Father         pancreatic    Cancer Maternal Grandmother         lung     Social History     Tobacco Use    Smoking status: Never Smoker    Smokeless tobacco: Never Used   Substance Use Topics    Alcohol use: Not Currently     I counseled the patient on the importance of not smoking and risks of ETOH.    Allergies   Allergen Reactions    Prochlorperazine Hives and Other (See Comments)     compazine     Vitals:    05/05/22 0931   BP: 115/80   Pulse: 89   Weight: 256 lb (116.1 kg)   Height: 5' 6\" (1.676 m)       Body mass index is 41.32 kg/m². Current Outpatient Medications:     Multiple Vitamin (MULTIVITAMIN, BARIATRIC FUSION COMPLETE, CHEW TAB), Take 2 tablets by mouth daily, Disp: , Rfl:     Hyoscyamine Sulfate SL (LEVSIN/SL) 0.125 MG SUBL, Place 0.125 mg under the tongue every 6 hours as needed (1 tablet under the tongue every 6 hours as needed (spasm)), Disp: 30 each, Rfl: 0    ondansetron (ZOFRAN ODT) 4 MG disintegrating tablet, Take 2 tablets by mouth every 8 hours as needed for Nausea, Disp: 30 tablet, Rfl: 0    ondansetron (ZOFRAN ODT) 4 MG disintegrating tablet, Take 2 tablets by mouth every 8 hours as needed for Nausea, Disp: 30 tablet, Rfl: 2    Eletriptan Hydrobromide (RELPAX PO), Take by mouth, Disp: , Rfl:     lamoTRIgine (LAMICTAL) 150 MG tablet, at bedtime , Disp: , Rfl:     pantoprazole (PROTONIX) 20 MG tablet, Take 20 mg by mouth daily, Disp: , Rfl:     sertraline (ZOLOFT) 100 MG tablet, Take 100 mg by mouth at bedtime , Disp: , Rfl:       Review of Systems - History obtained from the patient  General ROS: negative  Psychological ROS: negative  Hematological and Lymphatic ROS: negative  Endocrine ROS: negative  Respiratory ROS: negative  Cardiovascular ROS: negative  Gastrointestinal ROS:negative  Genito-Urinary ROS: negative  Musculoskeletal ROS: negative   Skin ROS: negative    Physical Exam   Vitals Reviewed   Constitutional: Patient is oriented to person, place, and time. Patient appears well-developed and well-nourished. Patient is active and cooperative. Non-toxic appearance. No distress. Neck: Trachea normal and normal range of motion. No JVD present. Pulmonary/Chest: Effort normal. No accessory muscle usage or stridor. No apnea. No respiratory distress. Cardiovascular: Normal rate and no JVD. Abdominal: Normal appearance. Patient exhibits no distension. Abdomen is soft, obese, non tender. Musculoskeletal: Normal range of motion. Patient exhibits no edema. Neurological: Patient is alert and oriented to person, place, and time. Patient has normal strength. GCS eye subscore is 4. GCS verbal subscore is 5. GCS motor subscore is 6. Skin: Skin is warm and dry. No abrasion and no rash noted. Patient is not diaphoretic. No cyanosis or erythema. Psychiatric: Patient has a normal mood and affect. Speech is normal and behavior is normal. Cognition and memory are normal.         A/P     Maria R Grimaldo is 50 y.o. female , now with Body mass index is 41.32 kg/m². s/p Sleeve gastrectomy, has lost 24 lbs since last visit, total of 27 lbs weight loss. The patient underwent dietary counseling with registered dietician. I have reviewed, discussed and agree with the dietary plan. Patient is trying hard to keep good dietary and behavior modifications. Patient is monitoring portion sizes, food choices and liquid calories. Patient is trying to exercise regularly. Patient pleased with the surgery outcomes. We discussed how her weight affects her overall health including:  Doris  was seen today for bariatrics post op follow up. Diagnoses and all orders for this visit:    S/P laparoscopic sleeve gastrectomy       and importance of weight loss to alleviate those co morbid conditions. I encouraged the patient to continue exercise and keeping healthy eating habits. Also counseled the patient extensively on post surgery care. RTC in 1 month    Diet and Exercise      Patient advised that its their responsibility to follow up for studies and/or labs ordered today.

## 2022-05-24 ENCOUNTER — TELEPHONE (OUTPATIENT)
Dept: BARIATRICS/WEIGHT MGMT | Age: 52
End: 2022-05-24

## 2022-05-24 NOTE — TELEPHONE ENCOUNTER
Spoke to patient about self pay billing showing up on her [employer] account. She received a notification and this was quite a surprise. I have emailed billing for our self-pay surgeries asking for the correction to be completed. Patient is aware this may take me a bit of time to get corrected.  AW

## 2022-06-02 ENCOUNTER — OFFICE VISIT (OUTPATIENT)
Dept: BARIATRICS/WEIGHT MGMT | Age: 52
End: 2022-06-02

## 2022-06-02 VITALS — WEIGHT: 242 LBS | HEIGHT: 66 IN | BODY MASS INDEX: 38.89 KG/M2

## 2022-06-02 DIAGNOSIS — Z98.84 S/P LAPAROSCOPIC SLEEVE GASTRECTOMY: Primary | ICD-10-CM

## 2022-06-02 PROCEDURE — 99024 POSTOP FOLLOW-UP VISIT: CPT | Performed by: SURGERY

## 2022-06-02 NOTE — PROGRESS NOTES
[facility] Physicians   General & Laparoscopic Surgery  [employer] Management [employer]       HPI:     Dolly Govea is a very pleasant 50 y.o. obese female , Body mass index is 39.06 kg/m². [place] And multiple medical problems who is presenting for bariatric follow up care. Dolly Govea is s/p laparoscopic sleeve gastrectomy by me   Comes today to the clinic without any complaints. Patient denies any nausea, vomiting, fevers, chills, shortness of breath, chest pain, constipation or urinary symptoms. Denies any heartburn nor dysphagia. Patient is feeling very well, and is very active. Patient is very pleased with the weight loss and resolution of co-morbid conditions. Past Medical History:   Diagnosis Date    Cancer ([address]     skin    Depression     Migraine     Pre-operative clearance      Past Surgical History:   Procedure Laterality Date    BREAST ENHANCEMENT SURGERY      COLONOSCOPY N/A 2/14/2022    COLONOSCOPY performed by Gurjit Hebert MD at [address] SKIN TAG REMOVAL      cancer    SLEEVE GASTRECTOMY N/A 4/18/2022    ROBOTIC ASSISTED LAPAROSCOPIC SLEEVE GASTRECTOMY SLEEVE LAPAROSCOPIC ROBOTIC XI, ROBOTIC HIATAL HERNIA REPAIR performed by Isidor Babinski, DO at [address] [facility] ENDOSCOPY N/A 2/14/2022    EGD BIOPSY performed by Isidor Babinski, DO at [facility] ENDOSCOPY     Family History   Problem Relation Age of Onset    Breast Cancer Mother     Cancer Father         pancreatic    Cancer Maternal Grandmother         lung     Social History     Tobacco Use    Smoking status: Never Smoker    Smokeless tobacco: Never Used   Substance Use Topics    Alcohol use: Not Currently     I counseled the patient on the importance of not smoking and risks of ETOH.    Allergies   Allergen Reactions    Prochlorperazine Hives and Other (See Comments)     compazine     Vitals:    06/02/22 1203   Weight: 242 lb (109.8 kg)   Height: 5' 6\" (1.676 m)       Body mass index is 39.06 kg/m². Current Outpatient Medications:     Multiple Vitamin (MULTIVITAMIN, BARIATRIC FUSION COMPLETE, CHEW TAB), Take 2 tablets by mouth daily, Disp: , Rfl:     Hyoscyamine Sulfate SL (LEVSIN/SL) 0.125 MG SUBL, Place 0.125 mg under the tongue every 6 hours as needed (1 tablet under the tongue every 6 hours as needed (spasm)), Disp: 30 each, Rfl: 0    ondansetron (ZOFRAN ODT) 4 MG disintegrating tablet, Take 2 tablets by mouth every 8 hours as needed for Nausea, Disp: 30 tablet, Rfl: 0    ondansetron (ZOFRAN ODT) 4 MG disintegrating tablet, Take 2 tablets by mouth every 8 hours as needed for Nausea, Disp: 30 tablet, Rfl: 2    Eletriptan Hydrobromide (RELPAX PO), Take by mouth, Disp: , Rfl:     lamoTRIgine (LAMICTAL) 150 MG tablet, at bedtime , Disp: , Rfl:     pantoprazole (PROTONIX) 20 MG tablet, Take 20 mg by mouth daily, Disp: , Rfl:     sertraline (ZOLOFT) 100 MG tablet, Take 100 mg by mouth at bedtime , Disp: , Rfl:       Review of Systems - History obtained from the patient  General ROS: negative  Psychological ROS: negative  Hematological and Lymphatic ROS: negative  Endocrine ROS: negative  Respiratory ROS: negative  Cardiovascular ROS: negative  Gastrointestinal ROS:negative  Genito-Urinary ROS: negative  Musculoskeletal ROS: negative   Skin ROS: negative    Physical Exam   Vitals Reviewed   Constitutional: Patient is oriented to person, place, and time. Patient appears well-developed and well-nourished. Patient is active and cooperative. Non-toxic appearance. No distress. Neck: Trachea normal and normal range of motion. No JVD present. Pulmonary/Chest: Effort normal. No accessory muscle usage or stridor. No apnea. No respiratory distress. Cardiovascular: Normal rate and no JVD. Abdominal: Normal appearance. Patient exhibits no distension. Abdomen is soft, obese, non tender. Musculoskeletal: Normal range of motion. Patient exhibits no edema.    Neurological: Patient is alert and oriented to person, place, and time. Patient has normal strength. GCS eye subscore is 4. GCS verbal subscore is 5. GCS motor subscore is 6. Skin: Skin is warm and dry. No abrasion and no rash noted. Patient is not diaphoretic. No cyanosis or erythema. Psychiatric: Patient has a normal mood and affect. Speech is normal and behavior is normal. Cognition and memory are normal.         A/P     Carmina Servant is 50 y.o. female , now with Body mass index is 39.06 kg/m². s/p Sleeve gastrectomy, has lost 14 lbs since last visit, total of 41 lbs weight loss. The patient underwent dietary counseling with registered dietician. I have reviewed, discussed and agree with the dietary plan. Patient is trying hard to keep good dietary and behavior modifications. Patient is monitoring portion sizes, food choices and liquid calories. Patient is trying to exercise regularly. Patient pleased with the surgery outcomes. We discussed how her weight affects her overall health including:  Shayne Ramos was seen today for bariatrics post op follow up. Diagnoses and all orders for this visit:    S/P laparoscopic sleeve gastrectomy       and importance of weight loss to alleviate those co morbid conditions. I encouraged the patient to continue exercise and keeping healthy eating habits. Also counseled the patient extensively on post surgery care. RTC in 2 months  Diet and Exercise      Patient advised that its their responsibility to follow up for studies and/or labs ordered today.

## 2022-06-02 NOTE — PATIENT INSTRUCTIONS
Patient received dietary handouts and education.     Goals:   - Move to phase 4 guidelines   - Consistently take multi-vitamins / choose alternative regimen from handout

## 2022-06-02 NOTE — PROGRESS NOTES
Dietary Assessment Note  Vitals:   Vitals:    06/02/22 1203   Weight: 242 lb (109.8 kg)   Height: 5' 6\" (1.676 m)    Patient lost 14 lbs over past ~4 weeks. Total Weight Loss: 41 lbs    Labs reviewed: no new labs    Protein intake: 60-80 grams/day     Fluid intake: >64 oz/day - water     Multivitamin/mineral intake: yes - fusion, but inconsistent    Calcium intake: no    Other: collagen / hair-skin-nails    Exercise: yes - up moving around, no strenous exercise    Nutrition Assessment: 6 week post-op visit.      B- protein shake w/ milk  S- sometimes sf popsicle OR crackers & cheese  L- deli meat & cheese  S- sometimes protein shake  D- chicken & some vegetables  S- none     Amount able to eat per sitting: ~1/2 cup volume, or less    Following 30/30/30 rule: yes- struggles to separate solids/liquids    Food Intolerances/issues: buffalo sauce     Client Concerns: none    Goals:   - Move to phase 4 guidelines   - Consistently take multi-vitamins / choose alternative regimen from handout    Plan: f/u as directed    Tatyana West, RD, LD

## 2022-06-08 NOTE — TELEPHONE ENCOUNTER
Received email stating that a portion of the error has been corrected. Transferred and adjusted from  today (05/31/2022) ($2944.00).       Thank Damian Smith [facility]

## 2022-08-04 ENCOUNTER — OFFICE VISIT (OUTPATIENT)
Dept: BARIATRICS/WEIGHT MGMT | Age: 52
End: 2022-08-04

## 2022-08-04 VITALS — WEIGHT: 230 LBS | BODY MASS INDEX: 36.96 KG/M2 | HEIGHT: 66 IN

## 2022-08-04 DIAGNOSIS — E66.01 SEVERE OBESITY (BMI 35.0-39.9) WITH COMORBIDITY (HCC): Primary | ICD-10-CM

## 2022-08-04 DIAGNOSIS — Z98.84 S/P LAPAROSCOPIC SLEEVE GASTRECTOMY: ICD-10-CM

## 2022-08-04 PROCEDURE — 99024 POSTOP FOLLOW-UP VISIT: CPT | Performed by: SURGERY

## 2022-08-04 NOTE — PATIENT INSTRUCTIONS
Patient received dietary handouts and education.     Goals:   - Continue plan  - Choose alternative multi-vitamin regimen (handout)  - Make sure to avoid grazing  - Slow down eating

## 2022-08-04 NOTE — PROGRESS NOTES
[facility]) Physicians   General & Laparoscopic Surgery  [employer] Management [employer]       HPI:     Lidia Longoria is a very pleasant 50 y.o. obese female , Body mass index is 37.12 kg/m². Danielle Fairmont And multiple medical problems who is presenting for bariatric follow up care. Lidia Longoria is s/p laparoscopic sleeve gastrectomy by me   Comes today to the clinic without any complaints. Patient denies any nausea, vomiting, fevers, chills, shortness of breath, chest pain, constipation or urinary symptoms. Denies any heartburn nor dysphagia. Patient is feeling very well, and is very active. Patient is very pleased with the weight loss and resolution of co-morbid conditions. Past Medical History:   Diagnosis Date    Cancer ([address]     skin    Depression     Migraine     Pre-operative clearance      Past Surgical History:   Procedure Laterality Date    BREAST ENHANCEMENT SURGERY      COLONOSCOPY N/A 2/14/2022    COLONOSCOPY performed by Cecile Posada MD at [facility] TAG REMOVAL      cancer    SLEEVE GASTRECTOMY N/A 4/18/2022    ROBOTIC ASSISTED LAPAROSCOPIC SLEEVE GASTRECTOMY SLEEVE LAPAROSCOPIC ROBOTIC XI, ROBOTIC HIATAL HERNIA REPAIR performed by Aaron Lopes DO at [place] [address] ENDOSCOPY N/A 2/14/2022    EGD BIOPSY performed by Aaron Lopes DO at [address] ENDOSCOPY     Family History   Problem Relation Age of Onset    Breast Cancer Mother     Cancer Father         pancreatic    Cancer Maternal Grandmother         lung     Social History     Tobacco Use    Smoking status: Never    Smokeless tobacco: Never   Substance Use Topics    Alcohol use: Not Currently     I counseled the patient on the importance of not smoking and risks of ETOH.    Allergies   Allergen Reactions    Prochlorperazine Hives and Other (See Comments)     compazine     Vitals:    08/04/22 0912   Weight: 230 lb (104.3 kg)   Height: 5' 6\" (1.676 m)       Body mass index is 37.12 kg/m². Current Outpatient Medications:     Multiple Vitamin (MULTIVITAMIN ADULT PO), Take by mouth, Disp: , Rfl:     Hyoscyamine Sulfate SL (LEVSIN/SL) 0.125 MG SUBL, Place 0.125 mg under the tongue every 6 hours as needed (1 tablet under the tongue every 6 hours as needed (spasm)), Disp: 30 each, Rfl: 0    ondansetron (ZOFRAN ODT) 4 MG disintegrating tablet, Take 2 tablets by mouth every 8 hours as needed for Nausea, Disp: 30 tablet, Rfl: 0    ondansetron (ZOFRAN ODT) 4 MG disintegrating tablet, Take 2 tablets by mouth every 8 hours as needed for Nausea, Disp: 30 tablet, Rfl: 2    Eletriptan Hydrobromide (RELPAX PO), Take by mouth, Disp: , Rfl:     lamoTRIgine (LAMICTAL) 150 MG tablet, at bedtime , Disp: , Rfl:     pantoprazole (PROTONIX) 20 MG tablet, Take 20 mg by mouth daily, Disp: , Rfl:     sertraline (ZOLOFT) 100 MG tablet, Take 100 mg by mouth at bedtime , Disp: , Rfl:       Review of Systems - History obtained from the patient  General ROS: negative  Psychological ROS: negative  Hematological and Lymphatic ROS: negative  Endocrine ROS: negative  Respiratory ROS: negative  Cardiovascular ROS: negative  Gastrointestinal ROS:negative  Genito-Urinary ROS: negative  Musculoskeletal ROS: negative   Skin ROS: negative    Physical Exam   Vitals Reviewed   Constitutional: Patient is oriented to person, place, and time. Patient appears well-developed and well-nourished. Patient is active and cooperative. Non-toxic appearance. No distress. Neck: Trachea normal and normal range of motion. No JVD present. Pulmonary/Chest: Effort normal. No accessory muscle usage or stridor. No apnea. No respiratory distress. Cardiovascular: Normal rate and no JVD. Abdominal: Normal appearance. Patient exhibits no distension. Abdomen is soft, obese, non tender. Musculoskeletal: Normal range of motion. Patient exhibits no edema. Neurological: Patient is alert and oriented to person, place, and time.  Patient has normal strength. GCS eye subscore is 4. GCS verbal subscore is 5. GCS motor subscore is 6. Skin: Skin is warm and dry. No abrasion and no rash noted. Patient is not diaphoretic. No cyanosis or erythema. Psychiatric: Patient has a normal mood and affect. Speech is normal and behavior is normal. Cognition and memory are normal.         A/P     Zakiya Miriam is 50 y.o. female , now with Body mass index is 37.12 kg/m². s/p Sleeve gastrectomy. The patient underwent dietary counseling with registered dietician. I have reviewed, discussed and agree with the dietary plan. Patient is trying hard to keep good dietary and behavior modifications. Patient is monitoring portion sizes, food choices and liquid calories. Patient is trying to exercise regularly. Patient pleased with the surgery outcomes. We discussed how her weight affects her overall health including:  Sumi Gonzalez was seen today for bariatrics post op follow up. Diagnoses and all orders for this visit:    Severe obesity (BMI 35.0-39. 9) with comorbidity ([address]    S/P laparoscopic sleeve gastrectomy       and importance of weight loss to alleviate those co morbid conditions. I encouraged the patient to continue exercise and keeping healthy eating habits. Also counseled the patient extensively on post surgery care. RTC in 3 months  Diet and Exercise      Patient advised that its their responsibility to follow up for studies and/or labs ordered today.

## 2022-08-04 NOTE — PROGRESS NOTES
Dietary Assessment Note  Vitals:   Vitals:    08/04/22 0912   Weight: 230 lb (104.3 kg)   Height: 5' 6\" (1.676 m)   Patient lost 12 lbs over past ~2 months. Total Weight Loss: 53 lbs    Labs reviewed: no new labs    Protein intake: 60-80 grams/day - occasional protein shake    Fluid intake: 48-64 oz/day - water    Multivitamin/mineral intake:  using vitamin patches    Calcium intake: no    Other: biotin / collagen    Exercise:  walking ~20 min 7 days/wk    Nutrition Assessment: 15 week post-op visit.     B- quest bar OR fiber cereal w/ skim milk  L-  cucumber/tomato & CC OR rolled up deli meat  S- shrimp OR quest protein chips OR hard boiled  D- protein (fish OR steak OR turkey) sometimes corn  S- sometimes protein shake (maybe 1-2x/wk)    Amount able to eat per sitting: ~3/4 cup max    Following 30/30/30 rule: yes, could slow down eating    Food Intolerances/issues: sugar    Client Concerns: none    Goals:   - Continue plan  - Choose alternative multi-vitamin regimen (handout)  - Make sure to avoid grazing  - Slow down eating    Plan: f/u as directed    Wild Matthews, RD, LD

## 2022-10-17 ENCOUNTER — TELEPHONE (OUTPATIENT)
Dept: BARIATRICS/WEIGHT MGMT | Age: 52
End: 2022-10-17

## 2022-10-17 NOTE — PROGRESS NOTES
Dietary Assessment Note  This eval was conducted via phone on 10/17/22 in preparation for their visit on 10/19/22 with Dr. Aron Reed. Patient report lost estimated 17.2lbs per home scale since last visit  Pt reports 212.8lbs on home scale. Total Weight Loss: estimate 70.2 lbs    Labs reviewed: no lab studies available for review at time of visit    Protein intake: only uses shake occasionally, not tracking    Fluid intake: 48-64oz    Multivitamin/mineral intake: fusion capsule with/ iron    Calcium intake: 2 chews     Other: biotin     Exercise: walking daily, averaging about 10-12 miles per week. Nutrition Assessment: 6 month post-op visit. Pt is eating 3 times per day, getting protein every time she eats. Amount able to eat per sitting: 3/4 cup, sometimes can eat too fast and she overdoes it and feels a sense of restriction     Following 30/30/30 rule: yes, but eats too quickly 15 minutes- has tried a timer, setting fork down with timing  She is . Food Intolerances/issues: none    Client Concerns hair loss     Goals/Plan: 4-5 protein based eating occasions- may use a protein shake for one of these eating occasions. Incorporating produce with meals/snacks. Slow down at meals to >20 minutes, discussed strategies.          Reanna Palacios, MS, RD, LD

## 2022-10-20 ENCOUNTER — OFFICE VISIT (OUTPATIENT)
Dept: BARIATRICS/WEIGHT MGMT | Age: 52
End: 2022-10-20

## 2022-10-20 VITALS — BODY MASS INDEX: 34.39 KG/M2 | HEIGHT: 66 IN | WEIGHT: 214 LBS

## 2022-10-20 DIAGNOSIS — Z98.84 S/P LAPAROSCOPIC SLEEVE GASTRECTOMY: Primary | ICD-10-CM

## 2022-10-20 PROCEDURE — 99024 POSTOP FOLLOW-UP VISIT: CPT | Performed by: SURGERY

## 2022-10-20 NOTE — PROGRESS NOTES
[facility]) Physicians   General & Laparoscopic Surgery  Weight Management [employer]       HPI:     Ana Whatley is a very pleasant 50 y.o. obese female , Body mass index is 34.54 kg/m². Jun Medin And multiple medical problems who is presenting for bariatric follow up care. Ana Whatley is s/p laparoscopic sleeve gastrectomy by me   Comes today to the clinic without any complaints. Patient denies any nausea, vomiting, fevers, chills, shortness of breath, chest pain, constipation or urinary symptoms. Denies any heartburn nor dysphagia. Patient is feeling very well, and is very active. Patient is very pleased with the weight loss and resolution of co-morbid conditions. Past Medical History:   Diagnosis Date    Cancer ([address]     skin    Depression     Migraine     Pre-operative clearance      Past Surgical History:   Procedure Laterality Date    BREAST ENHANCEMENT SURGERY      COLONOSCOPY N/A 2/14/2022    COLONOSCOPY performed by Dante Jordan MD at [address] TAG REMOVAL      cancer    SLEEVE GASTRECTOMY N/A 4/18/2022    ROBOTIC ASSISTED LAPAROSCOPIC SLEEVE GASTRECTOMY SLEEVE LAPAROSCOPIC ROBOTIC XI, ROBOTIC HIATAL HERNIA REPAIR performed by Glen Toro DO at [facility] ENDOSCOPY N/A 2/14/2022    EGD BIOPSY performed by Glen Toro DO at [facility] ENDOSCOPY     Family History   Problem Relation Age of Onset    Breast Cancer Mother     Cancer Father         pancreatic    Cancer Maternal Grandmother         lung     Social History     Tobacco Use    Smoking status: Never    Smokeless tobacco: Never   Substance Use Topics    Alcohol use: Not Currently     I counseled the patient on the importance of not smoking and risks of ETOH.    Allergies   Allergen Reactions    Prochlorperazine Hives and Other (See Comments)     compazine     Vitals:    10/20/22 1112   Weight: 214 lb (97.1 kg)   Height: 5' 6\" (1.676 m)       Body mass index is 34.54 kg/m². Current Outpatient Medications:     Multiple Vitamin (MULTIVITAMIN ADULT PO), Take by mouth, Disp: , Rfl:     Eletriptan Hydrobromide (RELPAX PO), Take by mouth, Disp: , Rfl:     lamoTRIgine (LAMICTAL) 150 MG tablet, at bedtime , Disp: , Rfl:     sertraline (ZOLOFT) 100 MG tablet, Take 100 mg by mouth at bedtime , Disp: , Rfl:       Review of Systems - History obtained from the patient  General ROS: negative  Psychological ROS: negative  Hematological and Lymphatic ROS: negative  Endocrine ROS: negative  Respiratory ROS: negative  Cardiovascular ROS: negative  Gastrointestinal ROS:negative  Genito-Urinary ROS: negative  Musculoskeletal ROS: negative   Skin ROS: negative    Physical Exam   Vitals Reviewed   Constitutional: Patient is oriented to person, place, and time. Patient appears well-developed and well-nourished. Patient is active and cooperative. Non-toxic appearance. No distress. Neck: Trachea normal and normal range of motion. No JVD present. Pulmonary/Chest: Effort normal. No accessory muscle usage or stridor. No apnea. No respiratory distress. Cardiovascular: Normal rate and no JVD. Abdominal: Normal appearance. Patient exhibits no distension. Abdomen is soft, obese, non tender. Musculoskeletal: Normal range of motion. Patient exhibits no edema. Neurological: Patient is alert and oriented to person, place, and time. Patient has normal strength. GCS eye subscore is 4. GCS verbal subscore is 5. GCS motor subscore is 6. Skin: Skin is warm and dry. No abrasion and no rash noted. Patient is not diaphoretic. No cyanosis or erythema. Psychiatric: Patient has a normal mood and affect. Speech is normal and behavior is normal. Cognition and memory are normal.         A/P     Anthony Jimenez is 50 y.o. female , now with Body mass index is 34.54 kg/m². s/p Sleeve gastrectomy. The patient underwent dietary counseling with registered dietician.  I have reviewed, discussed and agree with the dietary plan. Patient is trying hard to keep good dietary and behavior modifications. Patient is monitoring portion sizes, food choices and liquid calories. Patient is trying to exercise regularly. Patient pleased with the surgery outcomes. We discussed how her weight affects her overall health including:  Verónica Vuong was seen today for bariatrics post op follow up. Diagnoses and all orders for this visit:    S/P laparoscopic sleeve gastrectomy       and importance of weight loss to alleviate those co morbid conditions. I encouraged the patient to continue exercise and keeping healthy eating habits. Also counseled the patient extensively on post surgery care. RTC in 2 months  Diet and Exercise      Patient advised that its their responsibility to follow up for studies and/or labs ordered today.

## 2023-01-18 NOTE — PROGRESS NOTES
Dietary Assessment Note    This eval was conducted via phone on 1/18/23 in preparation for their visit on 1/19/23 with Dr. Arlen Huff. Vitals: .2 lb per pt report    Patient lost 18.8 lbs over past ~3 months. Total Weight Loss: 83.8 lbs    Labs reviewed: no new labs    Protein intake: 60-80 grams/day     Fluid intake: 48-64 oz/day - water    Multivitamin/mineral intake: yes - fusion capsule w/ iron    Calcium intake: yes - 2 OTC    Other: none    Exercise:  walking btw 2-6 miles 7 days a week    Nutrition Assessment: post-op visit.      B- 1/2 c protein cereal w/ banana  L- deli turkey w/ cheese/little burrsi  S- sometimes protein cereal  D- baked chicken & green beans    Amount able to eat per sitting: depends / ~1-1.25 cup volume    Following 30/30/30 rule: hardest part, working on this, doing better    Food Intolerances/issues: none    Client Concerns: none    Goals:   - Continue plan    Plan: f/u as directed    Angélica Us RD, LD

## 2023-01-19 ENCOUNTER — OFFICE VISIT (OUTPATIENT)
Dept: BARIATRICS/WEIGHT MGMT | Age: 53
End: 2023-01-19

## 2023-01-19 VITALS — WEIGHT: 198 LBS | BODY MASS INDEX: 31.82 KG/M2 | HEIGHT: 66 IN

## 2023-01-19 DIAGNOSIS — Z98.84 S/P LAPAROSCOPIC SLEEVE GASTRECTOMY: Primary | ICD-10-CM

## 2023-01-19 PROCEDURE — 99024 POSTOP FOLLOW-UP VISIT: CPT | Performed by: SURGERY

## 2023-02-06 NOTE — PROGRESS NOTES
[facility]) Physicians   General & Laparoscopic Surgery  Weight Management Solutions       HPI:     Glory Clark is a very pleasant 50 y.o. obese female , Body mass index is 31.96 kg/m². Gigi Patella And multiple medical problems who is presenting for bariatric follow up care. Glory Clark is s/p laparoscopic sleeve gastrectomy by me   Comes today to the clinic without any complaints. Patient denies any nausea, vomiting, fevers, chills, shortness of breath, chest pain, constipation or urinary symptoms. Denies any heartburn nor dysphagia. Patient is feeling very well, and is very active. Patient is very pleased with the weight loss and resolution of co-morbid conditions. Past Medical History:   Diagnosis Date    Cancer ([address]     skin    Depression     Migraine     Pre-operative clearance      Past Surgical History:   Procedure Laterality Date    BREAST ENHANCEMENT SURGERY      COLONOSCOPY N/A 2/14/2022    COLONOSCOPY performed by Humberto Overton MD at [facility] TAG REMOVAL      cancer    SLEEVE GASTRECTOMY N/A 4/18/2022    ROBOTIC ASSISTED LAPAROSCOPIC SLEEVE GASTRECTOMY SLEEVE LAPAROSCOPIC ROBOTIC XI, ROBOTIC HIATAL HERNIA REPAIR performed by Crystal Castellanos DO at [facility] ([place], [place]) ENDOSCOPY N/A 2/14/2022    EGD BIOPSY performed by Crystal Castellanos DO at [facility] ENDOSCOPY     Family History   Problem Relation Age of Onset    Breast Cancer Mother     Cancer Father         pancreatic    Cancer Maternal Grandmother         lung     Social History     Tobacco Use    Smoking status: Never    Smokeless tobacco: Never   Substance Use Topics    Alcohol use: Not Currently     I counseled the patient on the importance of not smoking and risks of ETOH.    Allergies   Allergen Reactions    Prochlorperazine Hives and Other (See Comments)     compazine     Vitals:    01/19/23 1435   Weight: 198 lb (89.8 kg)   Height: 5' 6\" (1.676 m)       Body mass index is 31.96 kg/m². Current Outpatient Medications:     Multiple Vitamin (MULTIVITAMIN ADULT PO), Take by mouth, Disp: , Rfl:     Eletriptan Hydrobromide (RELPAX PO), Take by mouth, Disp: , Rfl:     lamoTRIgine (LAMICTAL) 150 MG tablet, at bedtime , Disp: , Rfl:     sertraline (ZOLOFT) 100 MG tablet, Take 100 mg by mouth at bedtime , Disp: , Rfl:       Review of Systems - History obtained from the patient  General ROS: negative  Psychological ROS: negative  Hematological and Lymphatic ROS: negative  Endocrine ROS: negative  Respiratory ROS: negative  Cardiovascular ROS: negative  Gastrointestinal ROS:negative  Genito-Urinary ROS: negative  Musculoskeletal ROS: negative   Skin ROS: negative        A/P     Jessica Marie is 50 y.o. female , now with Body mass index is 31.96 kg/m². s/p Sleeve gastrectomy. The patient underwent dietary counseling with registered dietician. I have reviewed, discussed and agree with the dietary plan. Patient is trying hard to keep good dietary and behavior modifications. Patient is monitoring portion sizes, food choices and liquid calories. Patient is trying to exercise regularly. Patient pleased with the surgery outcomes. We discussed how her weight affects her overall health including:  Flaca Sanchez was seen today for bariatrics post op follow up. Diagnoses and all orders for this visit:    S/P laparoscopic sleeve gastrectomy       and importance of weight loss to alleviate those co morbid conditions. I encouraged the patient to continue exercise and keeping healthy eating habits. Also counseled the patient extensively on post surgery care. RTC in 3 months  Diet and Exercise      Patient advised that its their responsibility to follow up for studies and/or labs ordered today.

## 2023-04-18 ENCOUNTER — CLINICAL DOCUMENTATION (OUTPATIENT)
Dept: BARIATRICS/WEIGHT MGMT | Age: 53
End: 2023-04-18

## 2023-04-18 NOTE — PROGRESS NOTES
Discussed pulling out food scale and actually measuring protein to verify amount she feels she is consuming   Decrease to 3-4 oz protein per sitting + 1/2 cup nonstarchy veggie and 1/4 cup of starch   Switch MVI / Calcium according to recommendations on her sheet (pt verifies she still has) - discussed needs at least 1000 mg Calcium citrate - pt will either switch MVI OR Calcium to a recommended regimen     Plan: F/U per Provider    Patience KATIE Hughes, LD

## 2023-04-20 ENCOUNTER — OFFICE VISIT (OUTPATIENT)
Dept: BARIATRICS/WEIGHT MGMT | Age: 53
End: 2023-04-20
Payer: COMMERCIAL

## 2023-04-20 VITALS
DIASTOLIC BLOOD PRESSURE: 78 MMHG | HEART RATE: 78 BPM | SYSTOLIC BLOOD PRESSURE: 118 MMHG | WEIGHT: 189 LBS | HEIGHT: 66 IN | BODY MASS INDEX: 30.37 KG/M2

## 2023-04-20 DIAGNOSIS — Z98.84 S/P LAPAROSCOPIC SLEEVE GASTRECTOMY: ICD-10-CM

## 2023-04-20 DIAGNOSIS — E66.9 OBESITY (BMI 30.0-34.9): Primary | ICD-10-CM

## 2023-04-20 PROCEDURE — 99213 OFFICE O/P EST LOW 20 MIN: CPT | Performed by: SURGERY

## 2024-02-19 ENCOUNTER — HOSPITAL ENCOUNTER (INPATIENT)
Age: 54
LOS: 1 days | Discharge: HOME | DRG: 419 | End: 2024-02-20
Attending: EMERGENCY MEDICINE | Admitting: SURGERY
Payer: COMMERCIAL

## 2024-02-19 ENCOUNTER — ANESTHESIA (OUTPATIENT)
Dept: OPERATING ROOM | Age: 54
DRG: 419 | End: 2024-02-19
Payer: COMMERCIAL

## 2024-02-19 ENCOUNTER — ANESTHESIA EVENT (OUTPATIENT)
Dept: OPERATING ROOM | Age: 54
DRG: 419 | End: 2024-02-19
Payer: COMMERCIAL

## 2024-02-19 ENCOUNTER — TELEPHONE (OUTPATIENT)
Dept: BARIATRICS/WEIGHT MGMT | Age: 54
End: 2024-02-19

## 2024-02-19 DIAGNOSIS — K81.9 CHOLECYSTITIS: Primary | ICD-10-CM

## 2024-02-19 DIAGNOSIS — K81.0 ACUTE CHOLECYSTITIS: Primary | ICD-10-CM

## 2024-02-19 DIAGNOSIS — G89.18 ACUTE POST-OPERATIVE PAIN: ICD-10-CM

## 2024-02-19 PROBLEM — K80.00 ACUTE CALCULOUS CHOLECYSTITIS: Status: ACTIVE | Noted: 2024-02-19

## 2024-02-19 LAB
ALBUMIN SERPL-MCNC: 4.2 G/DL (ref 3.4–5)
ALP SERPL-CCNC: 93 U/L (ref 40–129)
ALT SERPL-CCNC: 30 U/L (ref 10–40)
ANION GAP SERPL CALCULATED.3IONS-SCNC: 11 MMOL/L (ref 3–16)
AST SERPL-CCNC: 20 U/L (ref 15–37)
BASOPHILS # BLD: 0 K/UL (ref 0–0.2)
BASOPHILS NFR BLD: 0.5 %
BILIRUB DIRECT SERPL-MCNC: <0.2 MG/DL (ref 0–0.3)
BILIRUB INDIRECT SERPL-MCNC: NORMAL MG/DL (ref 0–1)
BILIRUB SERPL-MCNC: 0.3 MG/DL (ref 0–1)
BUN SERPL-MCNC: 25 MG/DL (ref 7–20)
CALCIUM SERPL-MCNC: 9.2 MG/DL (ref 8.3–10.6)
CHLORIDE SERPL-SCNC: 96 MMOL/L (ref 99–110)
CO2 SERPL-SCNC: 26 MMOL/L (ref 21–32)
CREAT SERPL-MCNC: 0.9 MG/DL (ref 0.6–1.1)
DEPRECATED RDW RBC AUTO: 13.9 % (ref 12.4–15.4)
EOSINOPHIL # BLD: 0 K/UL (ref 0–0.6)
EOSINOPHIL NFR BLD: 0.7 %
GFR SERPLBLD CREATININE-BSD FMLA CKD-EPI: >60 ML/MIN/{1.73_M2}
GLUCOSE SERPL-MCNC: 81 MG/DL (ref 70–99)
HCG SERPL QL: NEGATIVE
HCT VFR BLD AUTO: 41.3 % (ref 36–48)
HGB BLD-MCNC: 13.9 G/DL (ref 12–16)
LYMPHOCYTES # BLD: 1.5 K/UL (ref 1–5.1)
LYMPHOCYTES NFR BLD: 30.9 %
MAGNESIUM SERPL-MCNC: 2.1 MG/DL (ref 1.8–2.4)
MCH RBC QN AUTO: 29.5 PG (ref 26–34)
MCHC RBC AUTO-ENTMCNC: 33.6 G/DL (ref 31–36)
MCV RBC AUTO: 87.7 FL (ref 80–100)
MONOCYTES # BLD: 0.4 K/UL (ref 0–1.3)
MONOCYTES NFR BLD: 7.7 %
NEUTROPHILS # BLD: 3 K/UL (ref 1.7–7.7)
NEUTROPHILS NFR BLD: 60.2 %
PHOSPHATE SERPL-MCNC: 2.6 MG/DL (ref 2.5–4.9)
PLATELET # BLD AUTO: 327 K/UL (ref 135–450)
PMV BLD AUTO: 7.8 FL (ref 5–10.5)
POTASSIUM SERPL-SCNC: 3.8 MMOL/L (ref 3.5–5.1)
PROT SERPL-MCNC: 7.3 G/DL (ref 6.4–8.2)
RBC # BLD AUTO: 4.7 M/UL (ref 4–5.2)
SODIUM SERPL-SCNC: 133 MMOL/L (ref 136–145)
WBC # BLD AUTO: 5 K/UL (ref 4–11)

## 2024-02-19 PROCEDURE — 6360000002 HC RX W HCPCS: Performed by: ANESTHESIOLOGY

## 2024-02-19 PROCEDURE — 2500000003 HC RX 250 WO HCPCS: Performed by: SURGERY

## 2024-02-19 PROCEDURE — 7100000001 HC PACU RECOVERY - ADDTL 15 MIN: Performed by: SURGERY

## 2024-02-19 PROCEDURE — L0450 TLSO FLEX TRUNK/THOR PRE OTS: HCPCS | Performed by: SURGERY

## 2024-02-19 PROCEDURE — 83735 ASSAY OF MAGNESIUM: CPT

## 2024-02-19 PROCEDURE — 6370000000 HC RX 637 (ALT 250 FOR IP): Performed by: STUDENT IN AN ORGANIZED HEALTH CARE EDUCATION/TRAINING PROGRAM

## 2024-02-19 PROCEDURE — 3700000001 HC ADD 15 MINUTES (ANESTHESIA): Performed by: SURGERY

## 2024-02-19 PROCEDURE — 88304 TISSUE EXAM BY PATHOLOGIST: CPT

## 2024-02-19 PROCEDURE — 2709999900 HC NON-CHARGEABLE SUPPLY: Performed by: SURGERY

## 2024-02-19 PROCEDURE — 99285 EMERGENCY DEPT VISIT HI MDM: CPT

## 2024-02-19 PROCEDURE — 8E0W4CZ ROBOTIC ASSISTED PROCEDURE OF TRUNK REGION, PERCUTANEOUS ENDOSCOPIC APPROACH: ICD-10-PCS | Performed by: SURGERY

## 2024-02-19 PROCEDURE — 2580000003 HC RX 258: Performed by: STUDENT IN AN ORGANIZED HEALTH CARE EDUCATION/TRAINING PROGRAM

## 2024-02-19 PROCEDURE — 85025 COMPLETE CBC W/AUTO DIFF WBC: CPT

## 2024-02-19 PROCEDURE — 47562 LAPAROSCOPIC CHOLECYSTECTOMY: CPT | Performed by: SURGERY

## 2024-02-19 PROCEDURE — 84703 CHORIONIC GONADOTROPIN ASSAY: CPT

## 2024-02-19 PROCEDURE — 0FT44ZZ RESECTION OF GALLBLADDER, PERCUTANEOUS ENDOSCOPIC APPROACH: ICD-10-PCS | Performed by: SURGERY

## 2024-02-19 PROCEDURE — 2720000010 HC SURG SUPPLY STERILE: Performed by: SURGERY

## 2024-02-19 PROCEDURE — 2580000003 HC RX 258: Performed by: SURGERY

## 2024-02-19 PROCEDURE — 6360000002 HC RX W HCPCS: Performed by: STUDENT IN AN ORGANIZED HEALTH CARE EDUCATION/TRAINING PROGRAM

## 2024-02-19 PROCEDURE — S2900 ROBOTIC SURGICAL SYSTEM: HCPCS | Performed by: SURGERY

## 2024-02-19 PROCEDURE — 1200000000 HC SEMI PRIVATE

## 2024-02-19 PROCEDURE — 7100000000 HC PACU RECOVERY - FIRST 15 MIN: Performed by: SURGERY

## 2024-02-19 PROCEDURE — 80076 HEPATIC FUNCTION PANEL: CPT

## 2024-02-19 PROCEDURE — 6360000002 HC RX W HCPCS: Performed by: SURGERY

## 2024-02-19 PROCEDURE — 80069 RENAL FUNCTION PANEL: CPT

## 2024-02-19 PROCEDURE — 6360000002 HC RX W HCPCS: Performed by: EMERGENCY MEDICINE

## 2024-02-19 PROCEDURE — 3600000009 HC SURGERY ROBOT BASE: Performed by: SURGERY

## 2024-02-19 PROCEDURE — 2500000003 HC RX 250 WO HCPCS: Performed by: ANESTHESIOLOGY

## 2024-02-19 PROCEDURE — 2500000003 HC RX 250 WO HCPCS

## 2024-02-19 PROCEDURE — 99223 1ST HOSP IP/OBS HIGH 75: CPT | Performed by: SURGERY

## 2024-02-19 PROCEDURE — C1889 IMPLANT/INSERT DEVICE, NOC: HCPCS | Performed by: SURGERY

## 2024-02-19 PROCEDURE — 3700000000 HC ANESTHESIA ATTENDED CARE: Performed by: SURGERY

## 2024-02-19 PROCEDURE — 6360000002 HC RX W HCPCS

## 2024-02-19 PROCEDURE — BF522Z0 OTHER IMAGING OF GALLBLADDER USING FLUORESCING AGENT, INTRAOPERATIVE: ICD-10-PCS | Performed by: SURGERY

## 2024-02-19 PROCEDURE — A4217 STERILE WATER/SALINE, 500 ML: HCPCS | Performed by: SURGERY

## 2024-02-19 PROCEDURE — 3600000019 HC SURGERY ROBOT ADDTL 15MIN: Performed by: SURGERY

## 2024-02-19 DEVICE — CLIP INT L POLYMER LOK LIG HEM O LOK (6EA/PK): Type: IMPLANTABLE DEVICE | Site: BILE DUCT | Status: FUNCTIONAL

## 2024-02-19 RX ORDER — SODIUM CHLORIDE 0.9 % (FLUSH) 0.9 %
5-40 SYRINGE (ML) INJECTION PRN
Status: DISCONTINUED | OUTPATIENT
Start: 2024-02-19 | End: 2024-02-19 | Stop reason: HOSPADM

## 2024-02-19 RX ORDER — ACETAMINOPHEN 500 MG
1000 TABLET ORAL EVERY 6 HOURS
Status: DISCONTINUED | OUTPATIENT
Start: 2024-02-19 | End: 2024-02-20 | Stop reason: HOSPADM

## 2024-02-19 RX ORDER — ONDANSETRON 4 MG/1
4 TABLET, ORALLY DISINTEGRATING ORAL EVERY 8 HOURS PRN
Status: DISCONTINUED | OUTPATIENT
Start: 2024-02-19 | End: 2024-02-20 | Stop reason: HOSPADM

## 2024-02-19 RX ORDER — INDOCYANINE GREEN AND WATER 25 MG
KIT INJECTION PRN
Status: DISCONTINUED | OUTPATIENT
Start: 2024-02-19 | End: 2024-02-19 | Stop reason: HOSPADM

## 2024-02-19 RX ORDER — LIDOCAINE HYDROCHLORIDE 20 MG/ML
INJECTION, SOLUTION INTRAVENOUS PRN
Status: DISCONTINUED | OUTPATIENT
Start: 2024-02-19 | End: 2024-02-19 | Stop reason: SDUPTHER

## 2024-02-19 RX ORDER — SODIUM CHLORIDE, SODIUM LACTATE, POTASSIUM CHLORIDE, CALCIUM CHLORIDE 600; 310; 30; 20 MG/100ML; MG/100ML; MG/100ML; MG/100ML
INJECTION, SOLUTION INTRAVENOUS CONTINUOUS
Status: DISCONTINUED | OUTPATIENT
Start: 2024-02-19 | End: 2024-02-19 | Stop reason: ALTCHOICE

## 2024-02-19 RX ORDER — SODIUM CHLORIDE 9 MG/ML
INJECTION, SOLUTION INTRAVENOUS PRN
Status: DISCONTINUED | OUTPATIENT
Start: 2024-02-19 | End: 2024-02-19 | Stop reason: HOSPADM

## 2024-02-19 RX ORDER — MEPERIDINE HYDROCHLORIDE 25 MG/ML
12.5 INJECTION INTRAMUSCULAR; INTRAVENOUS; SUBCUTANEOUS EVERY 5 MIN PRN
Status: DISCONTINUED | OUTPATIENT
Start: 2024-02-19 | End: 2024-02-19 | Stop reason: HOSPADM

## 2024-02-19 RX ORDER — IPRATROPIUM BROMIDE AND ALBUTEROL SULFATE 2.5; .5 MG/3ML; MG/3ML
1 SOLUTION RESPIRATORY (INHALATION)
Status: DISCONTINUED | OUTPATIENT
Start: 2024-02-19 | End: 2024-02-19 | Stop reason: HOSPADM

## 2024-02-19 RX ORDER — HYDROMORPHONE HYDROCHLORIDE 1 MG/ML
0.5 INJECTION, SOLUTION INTRAMUSCULAR; INTRAVENOUS; SUBCUTANEOUS EVERY 5 MIN PRN
Status: DISCONTINUED | OUTPATIENT
Start: 2024-02-19 | End: 2024-02-19 | Stop reason: HOSPADM

## 2024-02-19 RX ORDER — KETOROLAC TROMETHAMINE 15 MG/ML
INJECTION, SOLUTION INTRAMUSCULAR; INTRAVENOUS PRN
Status: DISCONTINUED | OUTPATIENT
Start: 2024-02-19 | End: 2024-02-19 | Stop reason: SDUPTHER

## 2024-02-19 RX ORDER — PHENTERMINE HYDROCHLORIDE 37.5 MG/1
37.5 TABLET ORAL
COMMUNITY

## 2024-02-19 RX ORDER — SUCCINYLCHOLINE/SOD CL,ISO/PF 200MG/10ML
SYRINGE (ML) INTRAVENOUS PRN
Status: DISCONTINUED | OUTPATIENT
Start: 2024-02-19 | End: 2024-02-19 | Stop reason: SDUPTHER

## 2024-02-19 RX ORDER — LIDOCAINE HYDROCHLORIDE 20 MG/ML
INJECTION, SOLUTION EPIDURAL; INFILTRATION; INTRACAUDAL; PERINEURAL PRN
Status: DISCONTINUED | OUTPATIENT
Start: 2024-02-19 | End: 2024-02-19 | Stop reason: SDUPTHER

## 2024-02-19 RX ORDER — 0.9 % SODIUM CHLORIDE 0.9 %
1000 INTRAVENOUS SOLUTION INTRAVENOUS ONCE
Status: COMPLETED | OUTPATIENT
Start: 2024-02-19 | End: 2024-02-19

## 2024-02-19 RX ORDER — MIDAZOLAM HYDROCHLORIDE 1 MG/ML
INJECTION INTRAMUSCULAR; INTRAVENOUS PRN
Status: DISCONTINUED | OUTPATIENT
Start: 2024-02-19 | End: 2024-02-19 | Stop reason: SDUPTHER

## 2024-02-19 RX ORDER — DIPHENHYDRAMINE HYDROCHLORIDE 50 MG/ML
12.5 INJECTION INTRAMUSCULAR; INTRAVENOUS
Status: DISCONTINUED | OUTPATIENT
Start: 2024-02-19 | End: 2024-02-19 | Stop reason: HOSPADM

## 2024-02-19 RX ORDER — FENTANYL CITRATE 50 UG/ML
INJECTION, SOLUTION INTRAMUSCULAR; INTRAVENOUS PRN
Status: DISCONTINUED | OUTPATIENT
Start: 2024-02-19 | End: 2024-02-19 | Stop reason: SDUPTHER

## 2024-02-19 RX ORDER — LORAZEPAM 2 MG/ML
0.5 INJECTION INTRAMUSCULAR
Status: DISCONTINUED | OUTPATIENT
Start: 2024-02-19 | End: 2024-02-19 | Stop reason: HOSPADM

## 2024-02-19 RX ORDER — BUPIVACAINE HYDROCHLORIDE 2.5 MG/ML
INJECTION, SOLUTION EPIDURAL; INFILTRATION; INTRACAUDAL PRN
Status: DISCONTINUED | OUTPATIENT
Start: 2024-02-19 | End: 2024-02-19 | Stop reason: HOSPADM

## 2024-02-19 RX ORDER — HEPARIN SODIUM 5000 [USP'U]/ML
5000 INJECTION, SOLUTION INTRAVENOUS; SUBCUTANEOUS EVERY 8 HOURS SCHEDULED
Status: DISCONTINUED | OUTPATIENT
Start: 2024-02-19 | End: 2024-02-20 | Stop reason: HOSPADM

## 2024-02-19 RX ORDER — MAGNESIUM HYDROXIDE 1200 MG/15ML
LIQUID ORAL CONTINUOUS PRN
Status: DISCONTINUED | OUTPATIENT
Start: 2024-02-19 | End: 2024-02-19 | Stop reason: HOSPADM

## 2024-02-19 RX ORDER — FENTANYL CITRATE 50 UG/ML
25 INJECTION, SOLUTION INTRAMUSCULAR; INTRAVENOUS EVERY 5 MIN PRN
Status: DISCONTINUED | OUTPATIENT
Start: 2024-02-19 | End: 2024-02-19 | Stop reason: HOSPADM

## 2024-02-19 RX ORDER — CEFAZOLIN SODIUM 1 G/3ML
INJECTION, POWDER, FOR SOLUTION INTRAMUSCULAR; INTRAVENOUS PRN
Status: DISCONTINUED | OUTPATIENT
Start: 2024-02-19 | End: 2024-02-19 | Stop reason: SDUPTHER

## 2024-02-19 RX ORDER — DEXAMETHASONE SODIUM PHOSPHATE 4 MG/ML
INJECTION, SOLUTION INTRA-ARTICULAR; INTRALESIONAL; INTRAMUSCULAR; INTRAVENOUS; SOFT TISSUE PRN
Status: DISCONTINUED | OUTPATIENT
Start: 2024-02-19 | End: 2024-02-19 | Stop reason: SDUPTHER

## 2024-02-19 RX ORDER — PROPOFOL 10 MG/ML
INJECTION, EMULSION INTRAVENOUS PRN
Status: DISCONTINUED | OUTPATIENT
Start: 2024-02-19 | End: 2024-02-19 | Stop reason: SDUPTHER

## 2024-02-19 RX ORDER — ONDANSETRON 2 MG/ML
INJECTION INTRAMUSCULAR; INTRAVENOUS PRN
Status: DISCONTINUED | OUTPATIENT
Start: 2024-02-19 | End: 2024-02-19 | Stop reason: SDUPTHER

## 2024-02-19 RX ORDER — OXYCODONE HYDROCHLORIDE 5 MG/1
10 TABLET ORAL EVERY 4 HOURS PRN
Status: DISCONTINUED | OUTPATIENT
Start: 2024-02-19 | End: 2024-02-20 | Stop reason: HOSPADM

## 2024-02-19 RX ORDER — INDOCYANINE GREEN AND WATER 25 MG
KIT INJECTION PRN
Status: DISCONTINUED | OUTPATIENT
Start: 2024-02-19 | End: 2024-02-19 | Stop reason: SDUPTHER

## 2024-02-19 RX ORDER — SODIUM CHLORIDE 0.9 % (FLUSH) 0.9 %
10 SYRINGE (ML) INJECTION PRN
Status: DISCONTINUED | OUTPATIENT
Start: 2024-02-19 | End: 2024-02-20 | Stop reason: HOSPADM

## 2024-02-19 RX ORDER — SODIUM CHLORIDE 0.9 % (FLUSH) 0.9 %
10 SYRINGE (ML) INJECTION EVERY 12 HOURS SCHEDULED
Status: DISCONTINUED | OUTPATIENT
Start: 2024-02-19 | End: 2024-02-20 | Stop reason: HOSPADM

## 2024-02-19 RX ORDER — ROCURONIUM BROMIDE 10 MG/ML
INJECTION, SOLUTION INTRAVENOUS PRN
Status: DISCONTINUED | OUTPATIENT
Start: 2024-02-19 | End: 2024-02-19 | Stop reason: SDUPTHER

## 2024-02-19 RX ORDER — HYDROMORPHONE HYDROCHLORIDE 1 MG/ML
0.5 INJECTION, SOLUTION INTRAMUSCULAR; INTRAVENOUS; SUBCUTANEOUS ONCE
Status: COMPLETED | OUTPATIENT
Start: 2024-02-19 | End: 2024-02-19

## 2024-02-19 RX ORDER — SODIUM CHLORIDE, SODIUM LACTATE, POTASSIUM CHLORIDE, AND CALCIUM CHLORIDE .6; .31; .03; .02 G/100ML; G/100ML; G/100ML; G/100ML
IRRIGANT IRRIGATION PRN
Status: DISCONTINUED | OUTPATIENT
Start: 2024-02-19 | End: 2024-02-19 | Stop reason: ALTCHOICE

## 2024-02-19 RX ORDER — OXYCODONE HYDROCHLORIDE 5 MG/1
5 TABLET ORAL EVERY 4 HOURS PRN
Status: DISCONTINUED | OUTPATIENT
Start: 2024-02-19 | End: 2024-02-20 | Stop reason: HOSPADM

## 2024-02-19 RX ORDER — SODIUM CHLORIDE, SODIUM LACTATE, POTASSIUM CHLORIDE, CALCIUM CHLORIDE 600; 310; 30; 20 MG/100ML; MG/100ML; MG/100ML; MG/100ML
INJECTION, SOLUTION INTRAVENOUS CONTINUOUS
Status: DISCONTINUED | OUTPATIENT
Start: 2024-02-19 | End: 2024-02-20

## 2024-02-19 RX ORDER — SODIUM CHLORIDE 0.9 % (FLUSH) 0.9 %
5-40 SYRINGE (ML) INJECTION EVERY 12 HOURS SCHEDULED
Status: DISCONTINUED | OUTPATIENT
Start: 2024-02-19 | End: 2024-02-19 | Stop reason: HOSPADM

## 2024-02-19 RX ORDER — ONDANSETRON 2 MG/ML
4 INJECTION INTRAMUSCULAR; INTRAVENOUS
Status: DISCONTINUED | OUTPATIENT
Start: 2024-02-19 | End: 2024-02-19 | Stop reason: HOSPADM

## 2024-02-19 RX ORDER — HEPARIN SODIUM 5000 [USP'U]/ML
5000 INJECTION, SOLUTION INTRAVENOUS; SUBCUTANEOUS EVERY 8 HOURS SCHEDULED
Status: DISCONTINUED | OUTPATIENT
Start: 2024-02-19 | End: 2024-02-19 | Stop reason: SDUPTHER

## 2024-02-19 RX ORDER — ONDANSETRON 2 MG/ML
4 INJECTION INTRAMUSCULAR; INTRAVENOUS ONCE
Status: COMPLETED | OUTPATIENT
Start: 2024-02-19 | End: 2024-02-19

## 2024-02-19 RX ORDER — AMOXICILLIN AND CLAVULANATE POTASSIUM 875; 125 MG/1; MG/1
1 TABLET, FILM COATED ORAL 2 TIMES DAILY
Qty: 14 TABLET | Refills: 0 | Status: SHIPPED | OUTPATIENT
Start: 2024-02-19 | End: 2024-02-26

## 2024-02-19 RX ORDER — SODIUM CHLORIDE 9 MG/ML
INJECTION, SOLUTION INTRAVENOUS PRN
Status: DISCONTINUED | OUTPATIENT
Start: 2024-02-19 | End: 2024-02-20 | Stop reason: HOSPADM

## 2024-02-19 RX ORDER — ONDANSETRON 2 MG/ML
4 INJECTION INTRAMUSCULAR; INTRAVENOUS EVERY 6 HOURS PRN
Status: DISCONTINUED | OUTPATIENT
Start: 2024-02-19 | End: 2024-02-20 | Stop reason: HOSPADM

## 2024-02-19 RX ORDER — LABETALOL HYDROCHLORIDE 5 MG/ML
10 INJECTION, SOLUTION INTRAVENOUS
Status: DISCONTINUED | OUTPATIENT
Start: 2024-02-19 | End: 2024-02-19 | Stop reason: HOSPADM

## 2024-02-19 RX ADMIN — SODIUM CHLORIDE: 9 INJECTION, SOLUTION INTRAVENOUS at 22:52

## 2024-02-19 RX ADMIN — PIPERACILLIN SODIUM AND TAZOBACTAM SODIUM 4500 MG: 4; .5 INJECTION, POWDER, LYOPHILIZED, FOR SOLUTION INTRAVENOUS at 15:16

## 2024-02-19 RX ADMIN — FENTANYL CITRATE 50 MCG: 50 INJECTION, SOLUTION INTRAMUSCULAR; INTRAVENOUS at 18:38

## 2024-02-19 RX ADMIN — ACETAMINOPHEN 1000 MG: 500 TABLET ORAL at 23:05

## 2024-02-19 RX ADMIN — HEPARIN SODIUM 5000 UNITS: 5000 INJECTION INTRAVENOUS; SUBCUTANEOUS at 23:06

## 2024-02-19 RX ADMIN — HYDROMORPHONE HYDROCHLORIDE 0.25 MG: 1 INJECTION, SOLUTION INTRAMUSCULAR; INTRAVENOUS; SUBCUTANEOUS at 17:21

## 2024-02-19 RX ADMIN — SODIUM CHLORIDE, POTASSIUM CHLORIDE, SODIUM LACTATE AND CALCIUM CHLORIDE: 600; 310; 30; 20 INJECTION, SOLUTION INTRAVENOUS at 21:31

## 2024-02-19 RX ADMIN — DEXAMETHASONE SODIUM PHOSPHATE 4 MG: 4 INJECTION INTRA-ARTICULAR; INTRALESIONAL; INTRAMUSCULAR; INTRAVENOUS; SOFT TISSUE at 18:48

## 2024-02-19 RX ADMIN — INDOCYANINE GREEN AND WATER 5 MG: KIT at 18:48

## 2024-02-19 RX ADMIN — OXYCODONE 10 MG: 5 TABLET ORAL at 23:51

## 2024-02-19 RX ADMIN — HEPARIN SODIUM 5000 UNITS: 5000 INJECTION INTRAVENOUS; SUBCUTANEOUS at 17:21

## 2024-02-19 RX ADMIN — HYDROMORPHONE HYDROCHLORIDE 0.5 MG: 1 INJECTION, SOLUTION INTRAMUSCULAR; INTRAVENOUS; SUBCUTANEOUS at 21:10

## 2024-02-19 RX ADMIN — METHOCARBAMOL 500 MG: 100 INJECTION INTRAMUSCULAR; INTRAVENOUS at 23:16

## 2024-02-19 RX ADMIN — Medication 120 MG: at 18:40

## 2024-02-19 RX ADMIN — LIDOCAINE HYDROCHLORIDE 100 MG: 20 INJECTION, SOLUTION INTRAVENOUS at 18:35

## 2024-02-19 RX ADMIN — ONDANSETRON 4 MG: 2 INJECTION INTRAMUSCULAR; INTRAVENOUS at 20:22

## 2024-02-19 RX ADMIN — MIDAZOLAM HYDROCHLORIDE 2 MG: 2 INJECTION, SOLUTION INTRAMUSCULAR; INTRAVENOUS at 18:32

## 2024-02-19 RX ADMIN — PIPERACILLIN AND TAZOBACTAM 3375 MG: 3; .375 INJECTION, POWDER, LYOPHILIZED, FOR SOLUTION INTRAVENOUS at 23:03

## 2024-02-19 RX ADMIN — KETOROLAC TROMETHAMINE 30 MG: 15 INJECTION, SOLUTION INTRAMUSCULAR; INTRAVENOUS at 20:14

## 2024-02-19 RX ADMIN — CEFAZOLIN SODIUM 1 G: 1 POWDER, FOR SOLUTION INTRAMUSCULAR; INTRAVENOUS at 18:48

## 2024-02-19 RX ADMIN — HYDROMORPHONE HYDROCHLORIDE 0.5 MG: 1 INJECTION, SOLUTION INTRAMUSCULAR; INTRAVENOUS; SUBCUTANEOUS at 15:10

## 2024-02-19 RX ADMIN — LIDOCAINE HYDROCHLORIDE 50 MG: 20 INJECTION, SOLUTION EPIDURAL; INFILTRATION; INTRACAUDAL; PERINEURAL at 18:38

## 2024-02-19 RX ADMIN — FENTANYL CITRATE 50 MCG: 50 INJECTION, SOLUTION INTRAMUSCULAR; INTRAVENOUS at 19:10

## 2024-02-19 RX ADMIN — SODIUM CHLORIDE 1000 ML: 9 INJECTION, SOLUTION INTRAVENOUS at 17:23

## 2024-02-19 RX ADMIN — ONDANSETRON 4 MG: 2 INJECTION INTRAMUSCULAR; INTRAVENOUS at 15:08

## 2024-02-19 RX ADMIN — SODIUM CHLORIDE, POTASSIUM CHLORIDE, SODIUM LACTATE AND CALCIUM CHLORIDE: 600; 310; 30; 20 INJECTION, SOLUTION INTRAVENOUS at 15:23

## 2024-02-19 RX ADMIN — ROCURONIUM BROMIDE 50 MG: 10 INJECTION, SOLUTION INTRAVENOUS at 18:38

## 2024-02-19 RX ADMIN — PROPOFOL 150 MG: 10 INJECTION, EMULSION INTRAVENOUS at 18:38

## 2024-02-19 ASSESSMENT — PAIN DESCRIPTION - ORIENTATION
ORIENTATION: MID
ORIENTATION: RIGHT
ORIENTATION: MID

## 2024-02-19 ASSESSMENT — PAIN SCALES - GENERAL
PAINLEVEL_OUTOF10: 5
PAINLEVEL_OUTOF10: 7
PAINLEVEL_OUTOF10: 5
PAINLEVEL_OUTOF10: 4
PAINLEVEL_OUTOF10: 6
PAINLEVEL_OUTOF10: 4
PAINLEVEL_OUTOF10: 7
PAINLEVEL_OUTOF10: 0
PAINLEVEL_OUTOF10: 7
PAINLEVEL_OUTOF10: 8

## 2024-02-19 ASSESSMENT — PAIN DESCRIPTION - FREQUENCY
FREQUENCY: CONTINUOUS
FREQUENCY: CONTINUOUS

## 2024-02-19 ASSESSMENT — PAIN DESCRIPTION - DESCRIPTORS
DESCRIPTORS: ACHING
DESCRIPTORS: ACHING
DESCRIPTORS: ACHING;DISCOMFORT;SHARP
DESCRIPTORS: ACHING
DESCRIPTORS: ACHING

## 2024-02-19 ASSESSMENT — PAIN DESCRIPTION - LOCATION
LOCATION: ABDOMEN

## 2024-02-19 ASSESSMENT — PAIN - FUNCTIONAL ASSESSMENT
PAIN_FUNCTIONAL_ASSESSMENT: ACTIVITIES ARE NOT PREVENTED
PAIN_FUNCTIONAL_ASSESSMENT: 0-10

## 2024-02-19 ASSESSMENT — PAIN DESCRIPTION - ONSET
ONSET: ON-GOING
ONSET: ON-GOING

## 2024-02-19 ASSESSMENT — LIFESTYLE VARIABLES: SMOKING_STATUS: 0

## 2024-02-19 ASSESSMENT — PAIN DESCRIPTION - PAIN TYPE
TYPE: SURGICAL PAIN
TYPE: SURGICAL PAIN

## 2024-02-19 NOTE — ED PROVIDER NOTES
[facility]  EMERGENCY DEPARTMENT ENCOUNTER          ATTENDING PHYSICIAN NOTE       Date of evaluation: 2/19/2024    Chief Complaint     Abdominal Pain (Pt was in an ED on Friday and was told that she needed gallbladder. Pt called Dr. George this morning and was told to come in for surgery. )      History of Present Illness     Cecy Ibarra is a 53 y.o. female who presents to the emergency department with a complaint of right-sided abdominal pain and general malaise and known cholecystitis.  The patient was seen at outside hospital on Friday 217 and was having some right-sided abdominal pain associated with eating.  She had a CT scan of her abdomen pelvis which showed calculus cholecystitis and was offered admission to the hospital for likely operative intervention on Monday.  The patient did not want to stay in the hospital over the weekend and elected to be discharged.  She had a pre-existing relationship with Dr. George so called his office this morning and was told to come to the emergency department for evaluation.  The patient reports over the weekend she has been having some decreased p.o. intake has been afraid to eat secondary to pain she has had nausea but no episodes of vomiting.  Otherwise without complaints.    ASSESSMENT / PLAN  (MEDICAL DECISION MAKING)     INITIAL VITALS: BP: 111/82, Temp: 98.6 °F (37 °C), Pulse: 80, Respirations: 16, SpO2: 97 %      Cecy Ibarra is a 53 y.o. female who presented to the Emergency Department with a known acute cholecystitis.  The patient's old records were reviewed specifically her emergency department visit from 2/17/2024 where she had a CT scan at that time showing calculus cholecystitis.  General surgery was aware of her presence in the emergency department and had placed orders for antibiotics as well as some blood work.  I reviewed those orders and agree with them I have additionally added that the patient should receive Zofran as well as

## 2024-02-19 NOTE — H&P
Vitamin (MULTIVITAMIN ADULT PO) Take by mouth      Eletriptan Hydrobromide (RELPAX PO) Take by mouth      lamoTRIgine (LAMICTAL) 150 MG tablet at bedtime       sertraline (ZOLOFT) 100 MG tablet Take 1 tablet by mouth at bedtime         Current Meds  No current facility-administered medications for this encounter.      Family History:   Family History   Problem Relation Age of Onset    Breast Cancer Mother     Cancer Father         pancreatic    Cancer Maternal Grandmother         lung       Social History:   TOBACCO:   reports that she has never smoked. She has never used smokeless tobacco.  ETOH:   reports that she does not currently use alcohol.  DRUGS:   reports no history of drug use.    Review of Systems:   A 14 point review of systems was conducted, significant findings as noted in HPI. All other systems negative.     Physical exam:    There were no vitals filed for this visit.    General appearance: alert, no acute distress, grooming appropriate  Eyes: No scleral icterus, EOM grossly intact  Neck: trachea midline, no JVD, no lymphadenopathy, neck supple  Chest/Lungs: Normal effort with no accessory muscle use on RA  Cardiovascular: RRR  Abdomen: Soft, mild tenderness in the RUQ, no rebound, guarding, or rigidity  Skin: warm and dry, no rashes  Extremities: no edema, no cyanosis  Neuro: A&Ox3, no focal deficits, sensation intact    Labs:    CBC: No results for input(s): \"WBC\", \"HGB\", \"HCT\", \"MCV\", \"PLT\" in the last 72 hours.  BMP: No results for input(s): \"NA\", \"K\", \"CL\", \"CO2\", \"PHOS\", \"BUN\", \"CREATININE\", \"CA\" in the last 72 hours.  PT/INR: No results for input(s): \"PROTIME\", \"INR\" in the last 72 hours.  APTT: No results for input(s): \"APTT\" in the last 72 hours.  Liver Profile: No results found for: \"AST\", \"ALT\", \"ALB\", \"BILIDIR\", \"BILITOT\", \"ALKPHOS\", \"GGT\", \"5NUC\"No results found for: \"CHOL\", \"HDL\", \"TRIG\"  UA: No results found for: \"NITRITE\", \"COLORU\", \"PHUR\", \"LABCAST\", \"WBCUA\", \"RBCUA\", \"MUCUS\",

## 2024-02-19 NOTE — ANESTHESIA PRE PROCEDURE
Department of Anesthesiology  Preprocedure Note       Name:  Cecy Ibarra   Age:  53 y.o.  :  1970                                          MRN:  [id number]         Date:  2024      Surgeon: Surgeon(s):  Walter Hodge DO    Procedure: Procedure(s):  ROBOTIC, POSSIBLE LAPAROSCOPIC CHOLECYSTECTOMY    Medications prior to admission:   Prior to Admission medications    Medication Sig Start Date End Date Taking? Authorizing Provider   amoxicillin-clavulanate (AUGMENTIN) 875-125 MG per tablet Take 1 tablet by mouth 2 times daily for 7 days 24  Finn Dillard MD   Multiple Vitamin (MULTIVITAMIN ADULT PO) Take by mouth    Jeremie Bradley MD   Eletriptan Hydrobromide (RELPAX PO) Take by mouth    Jeremie Bradley MD   lamoTRIgine (LAMICTAL) 150 MG tablet at bedtime  22   Jeremie Bradley MD   sertraline (ZOLOFT) 100 MG tablet Take 1 tablet by mouth at bedtime 10/7/21   Jeremie Bradley MD       Current medications:    Current Facility-Administered Medications   Medication Dose Route Frequency Provider Last Rate Last Admin    heparin (porcine) injection 5,000 Units  5,000 Units SubCUTAneous 3 times per day Vamsi Rockwell MD        lactated ringers IV soln infusion   IntraVENous Continuous Vamsi Rockwell  mL/hr at 24 1523 New Bag at 24 1523    piperacillin-tazobactam (ZOSYN) 3,375 mg in sodium chloride 0.9 % 50 mL IVPB (mini-bag)  3,375 mg IntraVENous Q8H Vamsi Rockwell MD         Current Outpatient Medications   Medication Sig Dispense Refill    amoxicillin-clavulanate (AUGMENTIN) 875-125 MG per tablet Take 1 tablet by mouth 2 times daily for 7 days 14 tablet 0    Multiple Vitamin (MULTIVITAMIN ADULT PO) Take by mouth      Eletriptan Hydrobromide (RELPAX PO) Take by mouth      lamoTRIgine (LAMICTAL) 150 MG tablet at bedtime       sertraline (ZOLOFT) 100 MG tablet Take 1 tablet by mouth at bedtime         Allergies:    Allergies   Allergen Reactions

## 2024-02-19 NOTE — ED NOTES
ED TO INPATIENT SBAR HANDOFF    Patient Name: Cecy Ibarra   :  1970  53 y.o.   MRN:  [id number]  Preferred Name  Cecy  ED Room #:  A01/A01-01  Family/Caregiver Present yes  Restraints no   Sitter no   Sepsis Risk Score Sepsis Risk Score: 0.49    Situation  Code Status: Prior No additional code details.    Allergies: Prochlorperazine  Weight: Patient Vitals for the past 96 hrs (Last 3 readings):   Weight   24 1426 82.4 kg (181 lb 9.6 oz)     Arrived from: home  Chief Complaint:   Chief Complaint   Patient presents with    Abdominal Pain     Pt was in an ED on Friday and was told that she needed gallbladder. Pt called Dr. George this morning and was told to come in for surgery.      Hospital Problem/Diagnosis:  Principal Problem:    Acute cholecystitis  Resolved Problems:    * No resolved hospital problems. *    Imaging:   No orders to display     Abnormal labs: Abnormal Labs Reviewed - No abnormal labs to display  Critical values: no     Abnormal Assessment Findings: pt c/o abd pain and nausea since Friday and was found to have cholecystitis    Background  History:   Past Medical History:   Diagnosis Date    Cancer (HCC)     skin    Depression     Migraine     Pre-operative clearance        Assessment    Vitals/MEWS:        Vitals:    24 1426   BP: 111/82   Pulse: 80   Resp: 16   Temp: 98.6 °F (37 °C)   TempSrc: Oral   SpO2: 97%   Weight: 82.4 kg (181 lb 9.6 oz)   Height: 1.702 m (5' 7\")     FiO2 (%):   O2 Flow Rate: O2 Device: None (Room air)    Cardiac Rhythm:    Pain Assessment:  [] Verbal [] Ferreira Baker Scale  Pain Scale: Pain Assessment  Pain Assessment: 0-10  Pain Level: 4  Pain Location: Abdomen  Last documented pain score (0-10 scale) Pain Level: 4  Last documented pain medication administered: 0.5 mg dilaudid  Mental Status: oriented, alert, and coherent  Orientation Level:    NIH Score:    C-SSRS: Risk of Suicide: No Risk  Bedside swallow:    Nohelia Coma Scale (GCS): Nohelia

## 2024-02-19 NOTE — TELEPHONE ENCOUNTER
Patient calling the office to report that she went to [facility] in [place] on Friday night 2/16 for abdominal pain patient reports that she had a CT that confirmed Caculus Cholecystitis. Patient was referred to a provider for Cholecystectomy but she states she would feel more comfortable if it was you doing the procedure. Please advise.     Patient S/p sleeve 4/18/2022.     CT report available in care everywhere from 2/16 in \"other results\" tab. Working on getting CT images.

## 2024-02-20 VITALS
WEIGHT: 181.6 LBS | BODY MASS INDEX: 28.5 KG/M2 | SYSTOLIC BLOOD PRESSURE: 90 MMHG | HEIGHT: 67 IN | DIASTOLIC BLOOD PRESSURE: 56 MMHG | OXYGEN SATURATION: 96 % | RESPIRATION RATE: 17 BRPM | TEMPERATURE: 98.2 F | HEART RATE: 98 BPM

## 2024-02-20 LAB
ALBUMIN SERPL-MCNC: 3.4 G/DL (ref 3.4–5)
ALP SERPL-CCNC: 69 U/L (ref 40–129)
ALT SERPL-CCNC: 29 U/L (ref 10–40)
ANION GAP SERPL CALCULATED.3IONS-SCNC: 10 MMOL/L (ref 3–16)
AST SERPL-CCNC: 29 U/L (ref 15–37)
BASOPHILS # BLD: 0 K/UL (ref 0–0.2)
BASOPHILS NFR BLD: 0.3 %
BILIRUB DIRECT SERPL-MCNC: <0.2 MG/DL (ref 0–0.3)
BILIRUB INDIRECT SERPL-MCNC: ABNORMAL MG/DL (ref 0–1)
BILIRUB SERPL-MCNC: <0.2 MG/DL (ref 0–1)
BUN SERPL-MCNC: 14 MG/DL (ref 7–20)
CALCIUM SERPL-MCNC: 8.4 MG/DL (ref 8.3–10.6)
CHLORIDE SERPL-SCNC: 102 MMOL/L (ref 99–110)
CO2 SERPL-SCNC: 25 MMOL/L (ref 21–32)
CREAT SERPL-MCNC: 0.8 MG/DL (ref 0.6–1.1)
DEPRECATED RDW RBC AUTO: 14 % (ref 12.4–15.4)
EOSINOPHIL # BLD: 0 K/UL (ref 0–0.6)
EOSINOPHIL NFR BLD: 0 %
GFR SERPLBLD CREATININE-BSD FMLA CKD-EPI: >60 ML/MIN/{1.73_M2}
GLUCOSE SERPL-MCNC: 152 MG/DL (ref 70–99)
HCT VFR BLD AUTO: 34.5 % (ref 36–48)
HGB BLD-MCNC: 11.5 G/DL (ref 12–16)
LYMPHOCYTES # BLD: 1.3 K/UL (ref 1–5.1)
LYMPHOCYTES NFR BLD: 20.1 %
MAGNESIUM SERPL-MCNC: 1.8 MG/DL (ref 1.8–2.4)
MCH RBC QN AUTO: 29.4 PG (ref 26–34)
MCHC RBC AUTO-ENTMCNC: 33.3 G/DL (ref 31–36)
MCV RBC AUTO: 88.3 FL (ref 80–100)
MONOCYTES # BLD: 0.4 K/UL (ref 0–1.3)
MONOCYTES NFR BLD: 6.6 %
NEUTROPHILS # BLD: 4.7 K/UL (ref 1.7–7.7)
NEUTROPHILS NFR BLD: 73 %
PHOSPHATE SERPL-MCNC: 3.3 MG/DL (ref 2.5–4.9)
PLATELET # BLD AUTO: 296 K/UL (ref 135–450)
PMV BLD AUTO: 8 FL (ref 5–10.5)
POTASSIUM SERPL-SCNC: 3.9 MMOL/L (ref 3.5–5.1)
PROT SERPL-MCNC: 5.7 G/DL (ref 6.4–8.2)
RBC # BLD AUTO: 3.91 M/UL (ref 4–5.2)
SODIUM SERPL-SCNC: 137 MMOL/L (ref 136–145)
WBC # BLD AUTO: 6.5 K/UL (ref 4–11)

## 2024-02-20 PROCEDURE — 80076 HEPATIC FUNCTION PANEL: CPT

## 2024-02-20 PROCEDURE — 80069 RENAL FUNCTION PANEL: CPT

## 2024-02-20 PROCEDURE — 85025 COMPLETE CBC W/AUTO DIFF WBC: CPT

## 2024-02-20 PROCEDURE — 6360000002 HC RX W HCPCS: Performed by: STUDENT IN AN ORGANIZED HEALTH CARE EDUCATION/TRAINING PROGRAM

## 2024-02-20 PROCEDURE — 2580000003 HC RX 258: Performed by: STUDENT IN AN ORGANIZED HEALTH CARE EDUCATION/TRAINING PROGRAM

## 2024-02-20 PROCEDURE — 6370000000 HC RX 637 (ALT 250 FOR IP): Performed by: STUDENT IN AN ORGANIZED HEALTH CARE EDUCATION/TRAINING PROGRAM

## 2024-02-20 PROCEDURE — 36415 COLL VENOUS BLD VENIPUNCTURE: CPT

## 2024-02-20 PROCEDURE — 83735 ASSAY OF MAGNESIUM: CPT

## 2024-02-20 RX ORDER — DOCUSATE SODIUM 100 MG/1
100 CAPSULE, LIQUID FILLED ORAL 2 TIMES DAILY
Qty: 30 CAPSULE | Refills: 0 | Status: SHIPPED | OUTPATIENT
Start: 2024-02-20 | End: 2024-03-06

## 2024-02-20 RX ORDER — OXYCODONE HYDROCHLORIDE 5 MG/1
5 TABLET ORAL EVERY 6 HOURS PRN
Qty: 28 TABLET | Refills: 0 | Status: SHIPPED | OUTPATIENT
Start: 2024-02-20 | End: 2024-02-27

## 2024-02-20 RX ORDER — SODIUM CHLORIDE, SODIUM LACTATE, POTASSIUM CHLORIDE, CALCIUM CHLORIDE 600; 310; 30; 20 MG/100ML; MG/100ML; MG/100ML; MG/100ML
INJECTION, SOLUTION INTRAVENOUS CONTINUOUS
Status: DISCONTINUED | OUTPATIENT
Start: 2024-02-20 | End: 2024-02-20 | Stop reason: HOSPADM

## 2024-02-20 RX ORDER — DOCUSATE SODIUM 100 MG/1
100 CAPSULE, LIQUID FILLED ORAL 2 TIMES DAILY
Qty: 30 CAPSULE | Refills: 0 | Status: SHIPPED | OUTPATIENT
Start: 2024-02-20 | End: 2024-02-20

## 2024-02-20 RX ORDER — OXYCODONE HYDROCHLORIDE 5 MG/1
5 TABLET ORAL EVERY 6 HOURS PRN
Qty: 28 TABLET | Refills: 0 | Status: SHIPPED | OUTPATIENT
Start: 2024-02-20 | End: 2024-02-20

## 2024-02-20 RX ADMIN — ACETAMINOPHEN 1000 MG: 500 TABLET ORAL at 03:59

## 2024-02-20 RX ADMIN — PIPERACILLIN AND TAZOBACTAM 3375 MG: 3; .375 INJECTION, POWDER, LYOPHILIZED, FOR SOLUTION INTRAVENOUS at 06:35

## 2024-02-20 RX ADMIN — METHOCARBAMOL 500 MG: 100 INJECTION INTRAMUSCULAR; INTRAVENOUS at 05:42

## 2024-02-20 RX ADMIN — ACETAMINOPHEN 1000 MG: 500 TABLET ORAL at 10:41

## 2024-02-20 RX ADMIN — HEPARIN SODIUM 5000 UNITS: 5000 INJECTION INTRAVENOUS; SUBCUTANEOUS at 05:42

## 2024-02-20 ASSESSMENT — PAIN DESCRIPTION - ORIENTATION: ORIENTATION: LOWER

## 2024-02-20 ASSESSMENT — PAIN DESCRIPTION - DESCRIPTORS: DESCRIPTORS: DISCOMFORT

## 2024-02-20 ASSESSMENT — PAIN DESCRIPTION - LOCATION: LOCATION: ABDOMEN

## 2024-02-20 ASSESSMENT — PAIN SCALES - GENERAL
PAINLEVEL_OUTOF10: 3
PAINLEVEL_OUTOF10: 0

## 2024-02-20 ASSESSMENT — PAIN - FUNCTIONAL ASSESSMENT: PAIN_FUNCTIONAL_ASSESSMENT: ACTIVITIES ARE NOT PREVENTED

## 2024-02-20 NOTE — BRIEF OP NOTE
Brief Postoperative Note      Patient: Cecy Ibarra  YOB: 1970  MRN: [id number]    Date of Procedure: 2/19/2024    Pre-Op Diagnosis Codes:     * Acute cholecystitis [K81.0]    Post-Op Diagnosis: Same       Procedure(s):  ROBOTIC CHOLECYSTECTOMY    Surgeon(s):  Walter Hodge DO    Assistant:  Surgical Assistant: Serenity Kitchen  Resident: Sabina Collier DO    Anesthesia: General    Estimated Blood Loss (mL): 20    Complications: None    Specimens:   ID Type Source Tests Collected by Time Destination   A : A. GALLBLADDER Tissue Tissue SURGICAL PATHOLOGY Walter Hodge DO 2/19/2024 1928        Implants:  Implant Name Type Inv. Item Serial No.  Lot No. LRB No. Used Action   CLIP INT L POLYMER CRISTINA LIG HEM O CRISTINA (6EA/PK) - [id number]  CLIP INT L POLYMER CRISTINA LIG HEM O CRISTINA (6EA/PK)  [employer] [id number] N/A 1 Implanted         Drains:   [REMOVED] Urinary Catheter 02/19/24 Austin (Removed)       Findings: Edematous and distended gallbladder. Spillage of bile and stones inherent to procedure. Liver bed and abdomen copiously irrigated to clear fluid. Critical view of safety obtained prior to clipping and dividing the cystic artery and cystic duct.     Plan: Austin removed, void check. To surgical floor. CLD this evening, low fat diet in AM. LFTs and labs in AM. Continue Zosyn while inpatient.       Electronically signed by Sabina Collier DO on 2/19/2024 at 8:27 PM

## 2024-02-20 NOTE — PLAN OF CARE
Problem: Discharge Planning  Goal: Discharge to home or other facility with appropriate resources  2/20/2024 1001 by Latesha Torres RN  Outcome: Adequate for Discharge  2/20/2024 0816 by Latesha Torres RN  Outcome: Progressing  2/20/2024 0815 by Latesha Torres RN  Outcome: Progressing     Problem: Pain  Goal: Verbalizes/displays adequate comfort level or baseline comfort level  2/20/2024 1001 by Latesha Torres RN  Outcome: Adequate for Discharge  2/20/2024 0816 by Latesha Torres RN  Outcome: Progressing  2/20/2024 0815 by Latesha Torres RN  Outcome: Progressing     Problem: Gastrointestinal - Adult  Goal: Minimal or absence of nausea and vomiting  2/20/2024 1001 by Latesha Torres RN  Outcome: Adequate for Discharge  2/20/2024 0816 by Latesha Torres RN  Outcome: Progressing  Goal: Maintains adequate nutritional intake  2/20/2024 1001 by Latesha Torres RN  Outcome: Adequate for Discharge  2/20/2024 0816 by Latesha Torres RN  Outcome: Progressing

## 2024-02-20 NOTE — PROGRESS NOTES
4 Eyes Skin Assessment     NAME:  Cecy Ibarra  YOB: 1970  MEDICAL RECORD NUMBER:  [id number]    The patient is being assessed for  Admission    I agree that at least one RN has performed a thorough Head to Toe Skin Assessment on the patient. ALL assessment sites listed below have been assessed.      Areas assessed by both nurses:    Head, Face, Ears, Shoulders, Back, Chest, Arms, Elbows, Hands, Sacrum. Buttock, Coccyx, Ischium, and Legs. Feet and Heels        Does the Patient have a Wound? No noted wound(s)       Vitaliy Prevention initiated by RN: No  Wound Care Orders initiated by RN: No    Pressure Injury (Stage 3,4, Unstageable, DTI, NWPT, and Complex wounds) if present, place Wound referral order by RN under : No    New Ostomies, if present place, Ostomy referral order under : No     Nurse 1 eSignature: Electronically signed by Hari Balderas RN on 2/19/24 at 5:55 PM EST    **SHARE this note so that the co-signing nurse can place an eSignature**    Nurse 2 eSignature: Electronically signed by Salvador Castro RN on 2/19/24 at 6:14 PM EST    
PACU RN called and updated patient's family.   
PACU RN called for 5S, RN to come for bedside report.   
PACU Transfer to Floor Note    Procedure(s):  ROBOTIC CHOLECYSTECTOMY    Current Allergies: Prochlorperazine    Pt meets criteria as per Nate Score and ASPAN Standards to transfer to next phase of care.     No results for input(s): \"POCGLU\" in the last 72 hours.    Vitals:    02/19/24 2119   BP:    Pulse:    Resp: 15   Temp: 97.7 °F (36.5 °C)   SpO2: 96%     Vitals within 20% of pt's admission vitals as per NATE SCORE    SpO2: 96 %    O2 Flow Rate (L/min): 2 L/min      Intake/Output Summary (Last 24 hours) at 2/19/2024 2119  Last data filed at 2/19/2024 2032  Gross per 24 hour   Intake --   Output 135 ml   Net -135 ml       Pain assessment:  present - adequately treated    Pain Level: 5, tolerable    Patient was assessed for alterations to skin integrity. There were not alterations observed.    Is patient incontinent: no    Patient has all belongings at discharge from PACU.  PACU RN called and updated patient's family.    Handoff report given at bedside.   Family updated and directed to pt room 5363  **Pascual transported patient to unit.       2/19/2024 9:19 PM  
Patient AOX*4  Vitals stable   Room air  Pain controled with PRN pain meds.   Patient up three times  Voiding clear, yellow urine,  IV fluids infusing per order   Bed locked and in low position, alarm on, wearing gripper socks when ambulating, side table and call light within reach.    
Patient admitted to room 5319 from ED. Patient is A&O x 4. VSS. Patient oriented to the room all safety measures in place. Patient given IS and SCDs at this time. Admission orders released and patient 4 eyes completed. Admission documentation completed. No other needs are noted at this time.    [x] Bed alarm on and cord plugged into wall  [x] Bed in lowest position  [x] Call light and bedside table within reach  [x] Patient educated on all safety measures  []Oxygen connected to wall (if applicable)     Nurse 1 Esignature: Electronically signed by Hari Balderas RN on 2/19/24 at 5:56 PM EST  Nurse 2 Esignature: Electronically signed by Salvador Castro RN on 2/19/24 at 6:14 PM EST     
Patient arrived to PACU post ROBOTIC CHOLECYSTECTOMY  with Dr. Donaldo LEON on arrival. CRNA gave PACU RN report at bedside stating no complications during procedure. Surgical sites clean, dry and intact. Patient shows no signs of pain at this time. Will continue to monitor.   
d/c later today if tolerating diet.     Shawanda Rawls, CNP  02/20/24 6:13 AM  [facility]  [phone]

## 2024-02-20 NOTE — PLAN OF CARE
Problem: Discharge Planning  Goal: Discharge to home or other facility with appropriate resources  2/20/2024 0816 by Latesha Torres, RN  Outcome: Progressing  2/20/2024 0815 by Latesha Torres RN  Outcome: Progressing     Problem: Pain  Goal: Verbalizes/displays adequate comfort level or baseline comfort level  2/20/2024 0816 by Latesha Torres RN  Outcome: Progressing  2/20/2024 0815 by Latesha Torres RN  Outcome: Progressing     Problem: Gastrointestinal - Adult  Goal: Minimal or absence of nausea and vomiting  Outcome: Progressing  Goal: Maintains adequate nutritional intake  Outcome: Progressing

## 2024-02-20 NOTE — ANESTHESIA POSTPROCEDURE EVALUATION
Department of Anesthesiology  Postprocedure Note    Patient: Cecy Ibarra  MRN: [id number]  YOB: 1970  Date of evaluation: 2/19/2024    Procedure Summary       Date: 02/19/24 Room / Location: [address]    Anesthesia Start: 1832 Anesthesia Stop: 2039    Procedure: ROBOTIC CHOLECYSTECTOMY (Abdomen) Diagnosis:       Acute cholecystitis      (Acute cholecystitis [K81.0])    Surgeons: Walter Hodge DO Responsible Provider: Hugo Baldwin MD    Anesthesia Type: general ASA Status: 3            Anesthesia Type: No value filed.    Logan Phase I:      Logan Phase II:      Anesthesia Post Evaluation    Patient location during evaluation: PACU  Patient participation: complete - patient participated  Level of consciousness: awake  Airway patency: patent  Nausea & Vomiting: no nausea and no vomiting  Cardiovascular status: blood pressure returned to baseline and hemodynamically stable  Respiratory status: acceptable  Hydration status: euvolemic  Multimodal analgesia pain management approach  Pain management: adequate    No notable events documented.

## 2024-02-20 NOTE — DISCHARGE INSTRUCTIONS
Diet:   May resume regular diet    Wound Care:   Surgical grade glue was used on your incision, this will aid in the healing of your incision. It will peel off on its own within 10 days. If it does not peel off in 10 days, you may use petroleum jelly to gently remove it. Do not soak or scrub area of incision for 7-10 days.    Activity:   No heavy lifting greater than a milk jug, or 8 lbs until follow up.    Pain management:   For moderate to severe pain please take the Roxicodone that you were prescribed. If you take narcotics, note that they may cause constipation. For constipation take Colace up to two times a day as needed. If stools become loose, you may reduce the amount of colace you are taking or stop taking all together. Take as little narcotic pain medication as you can tolerate. No driving while on narcotics.    For mild to moderate pain you may take over-the-counter Tylenol or Motrin as directed on the packaging.     Return if:   Call/ Return to ED for increased redness, worsening pain, drainage from wound, or fevers greater than 101.5 F.      Follow up with Dr. Hodge in 2 week(s). Please call the office to make an appointment. [phone]

## 2024-02-20 NOTE — CARE COORDINATION
Case Management Assessment  Initial Evaluation    Date/Time of Evaluation: 2/20/2024 8:56 AM  Assessment Completed by: Letty Villavicencio RN    If patient is discharged prior to next notation, then this note serves as note for discharge by case management.    Patient Name: Cecy Ibarra                   YOB: 1970  Diagnosis: Acute cholecystitis [K81.0]                   Date / Time: 2/19/2024  2:20 PM    Patient Admission Status: Inpatient   Readmission Risk (Low < 19, Mod (19-27), High > 27): Readmission Risk Score: 5.7    Current PCP: Tenzin Ventura  PCP verified by CM? Yes    Chart Reviewed: Yes      History Provided by: Patient  Patient Orientation: Alert and Oriented    Patient Cognition: Alert    Hospitalization in the last 30 days (Readmission):  No    If yes, Readmission Assessment in CM Navigator will be completed.    Advance Directives:      Code Status: Full Code   Patient's Primary Decision Maker is: Legal Next of Kin    Primary Decision Maker: Lela Pemberton - Brother/Sister - [phone]    Discharge Planning:    Patient lives with: Spouse/Significant Other Type of Home: House  Primary Care Giver: Self  Patient Support Systems include: Spouse/Significant Other, Parent   Current Financial resources: None  Current community resources: None  Current services prior to admission: None            Current DME:              Type of Home Care services:  None    ADLS  Prior functional level: Independent in ADLs/IADLs  Current functional level: Independent in ADLs/IADLs    PT AM-PAC:   /24  OT AM-PAC:   /24    Family can provide assistance at DC: Yes  Would you like Case Management to discuss the discharge plan with any other family members/significant others, and if so, who? No  Plans to Return to Present Housing: Yes  Other Identified Issues/Barriers to RETURNING to current housing: N/A  Potential Assistance needed at discharge: N/A            Potential DME:    Patient expects to

## 2024-02-21 NOTE — OP NOTE
Robotic Cholecystectomy with Firefly Procedure Note     Indications: This patient presents with symptomatic gallbladder disease and will undergo robotic cholecystecomy.     Pre-operative Diagnosis:   Acute Cholecystitis with calculous     Post-operative Diagnosis: Same as pre-op      Procedures:  1- Robotic Cholecystectomy with Firefly      Surgeon: Walter Hodge     Assistant: Nando     Anesthesia: General endotracheal anesthesia     Procedure Details    The patient was seen again in the Holding Room. The risks, benefits, complications, treatment options, and expected outcomes were discussed with the patient. The possibilities of reaction to medication, pulmonary aspiration, perforation of viscus, bleeding, recurrent infection, finding a normal gallbladder, the need for additional procedures, failure to diagnose a condition, the possible need to convert to an open procedure, and creating a complication requiring transfusion or operation were discussed with the patient. The patient concurred with the proposed plan, giving informed consent. The site of surgery properly noted/marked. The patient was taken to Operating Room, properly identified and the procedure verified as Robotic assisted Laparoscopic Cholecystectomy. A Time Out was held and the above information confirmed.     Prior to the induction of general anesthesia, antibiotic prophylaxis was administered. SCD's in place. General endotracheal anesthesia was then administered and tolerated well. After the induction, the abdomen was prepped in the usual sterile fashion. The patient was positioned in the supine position with the arms comfortably positioned, along with some reverse trendelenberg.     Incision was made at Medrano's point in the left midclavicular line, Veress needle technique was used to achieve pneumoperitoneum which was then created with CO2 and tolerated well without any adverse changes in the patient's vital signs. Additional trocars were

## 2024-02-22 NOTE — DISCHARGE SUMMARY
by mouth every 6 hours as needed for Pain for up to 7 days. Intended supply: 7 days. Take lowest dose possible to manage pain Max Daily Amount: 20 mg            CONTINUE taking these medications      Adipex-P 37.5 MG tablet  Generic drug: phentermine     amoxicillin-clavulanate 875-125 MG per tablet  Commonly known as: AUGMENTIN  Take 1 tablet by mouth 2 times daily for 7 days     lamoTRIgine 150 MG tablet  Commonly known as: LAMICTAL     MULTIVITAMIN ADULT PO     RELPAX PO     sertraline 100 MG tablet  Commonly known as: ZOLOFT               Where to Get Your Medications        You can get these medications from any pharmacy    Bring a paper prescription for each of these medications  docusate sodium 100 MG capsule  oxyCODONE 5 MG immediate release tablet         Johnnie Hassan DO  PGY2, General Surgery  02/22/24  3:27 PM  [facility]  Pager: [phone]

## 2024-05-15 ENCOUNTER — PATIENT MESSAGE (OUTPATIENT)
Dept: BARIATRICS/WEIGHT MGMT | Age: 54
End: 2024-05-15

## 2024-05-15 ENCOUNTER — CLINICAL DOCUMENTATION (OUTPATIENT)
Dept: BARIATRICS/WEIGHT MGMT | Age: 54
End: 2024-05-15

## 2024-05-15 NOTE — PROGRESS NOTES
Dietary Assessment Note  This eval was conducted via phone on 5/15/24 in preparation for their visit on 5/16/24 with Dr. Hodge.      Vitals: 178 lb, per pt report    Patient lost ~10 lbs over past ~1 year.    Total Weight Loss: 101 lbs    Labs reviewed: 2/20/24 reviewed    Protein intake: 60-80 grams/day     Fluid intake: >64 oz/day- ~80oz, mostly water / sf flavor pkts    Multivitamin/mineral intake: no    Calcium intake: no    Other: none    Exercise: yes - walking daily, aims for 55988 steps daily, but definitely >5000 steps    Nutrition Assessment: 2 year post-op visit.  Pt had choley at the end of March, sometimes dairy bothers her stomach.  Pt is generally eating 3x/day.  Pt continues to focus on protein.    B- protein shake OR sausage sandwich  L- frozen protein bowl  D- protein & vegetable    Amount able to eat per sitting: ~2 cups volume    Following 30/30/30 rule: yes- but still finds it hard to avoid eating/drinking at the same time    Food Intolerances/issues:  dairy recently w/ cholecystectomy    Client Concerns: none    Goals:   - Embrace 30-30-30  - Measure portions, aim for 1.5 cups, add snack as needed   - Choose MVI & Calcium regimen  - Try FitOn evelyn, strength train 2x/wk    *Pt personal goal ~148 lb    Handout: Alternative MVI list (sent via [employer])    Plan: f/u in 1 year OR as directed    Eliza Steen, RD, LD

## 2024-05-16 ENCOUNTER — OFFICE VISIT (OUTPATIENT)
Dept: BARIATRICS/WEIGHT MGMT | Age: 54
End: 2024-05-16

## 2024-05-16 VITALS
DIASTOLIC BLOOD PRESSURE: 79 MMHG | SYSTOLIC BLOOD PRESSURE: 117 MMHG | WEIGHT: 180 LBS | BODY MASS INDEX: 28.93 KG/M2 | HEART RATE: 91 BPM | HEIGHT: 66 IN

## 2024-05-16 DIAGNOSIS — Z98.84 S/P LAPAROSCOPIC SLEEVE GASTRECTOMY: ICD-10-CM

## 2024-05-16 DIAGNOSIS — E66.3 OVERWEIGHT (BMI 25.0-29.9): Primary | ICD-10-CM

## 2024-05-16 RX ORDER — BUPROPION HCL 150 MG
150 TABLET,SUSTAINED-RELEASE 12 HR ORAL
COMMUNITY
Start: 2023-11-21

## 2024-05-16 NOTE — PROGRESS NOTES
[facility]   General & Laparoscopic Surgery  [employer]       HPI:     Cecy Ibarra is a very pleasant 54 y.o. obese female , Body mass index is 29.05 kg/m².. And multiple medical problems who is presenting for bariatric follow up care.   Cecy Ibarra is s/p laparoscopic sleeve gastrectomy by me   Comes today to the clinic without any complaints. Patient denies any nausea, vomiting, fevers, chills, shortness of breath, chest pain, constipation or urinary symptoms. Denies any heartburn nor dysphagia.   Patient is feeling very well, and is very active. Patient is very pleased with the weight loss and resolution of co-morbid conditions.        Past Medical History:   Diagnosis Date    Cancer (HCC)     skin    Cholelithiasis     Depression     Migraine     Pre-operative clearance      Past Surgical History:   Procedure Laterality Date    BREAST ENHANCEMENT SURGERY      CHOLECYSTECTOMY, LAPAROSCOPIC N/A 2/19/2024    ROBOTIC CHOLECYSTECTOMY performed by Walter Hodge DO at [facility] OR    COLONOSCOPY N/A 2/14/2022    COLONOSCOPY performed by Deny Hurst MD at [facility] ENDOSCOPY    KNEE SURGERY      SKIN TAG REMOVAL      cancer    SLEEVE GASTRECTOMY N/A 4/18/2022    ROBOTIC ASSISTED LAPAROSCOPIC SLEEVE GASTRECTOMY SLEEVE LAPAROSCOPIC ROBOTIC XI, ROBOTIC HIATAL HERNIA REPAIR performed by Walter Hodge DO at [facility] OR    UPPER GASTROINTESTINAL ENDOSCOPY N/A 2/14/2022    EGD BIOPSY performed by Walter Hodge DO at [facility] ENDOSCOPY     Family History   Problem Relation Age of Onset    Breast Cancer Mother     Cancer Father         pancreatic    Cancer Maternal Grandmother         lung     Social History     Tobacco Use    Smoking status: Never    Smokeless tobacco: Never   Substance Use Topics    Alcohol use: Not Currently     I counseled the patient on the importance of not smoking and risks of ETOH.   Allergies   Allergen Reactions    Prochlorperazine Hives and Other (See Comments)

## (undated) DEVICE — ARM DRAPE

## (undated) DEVICE — NEEDLE INSUF L150MM DIA2MM DISP FOR PNEUMOPERI ENDOPATH

## (undated) DEVICE — TROCAR: Brand: KII FIOS FIRST ENTRY

## (undated) DEVICE — GOWN,SIRUS,POLYRNF,BRTHSLV,LG,30/CS: Brand: MEDLINE

## (undated) DEVICE — SOLUTION INJ LR VISIV 1000ML BG

## (undated) DEVICE — SUTURE MCRYL + SZ 4-0 L27IN ABSRB UD L19MM PS-2 3/8 CIR MCP426H

## (undated) DEVICE — APPLICATOR MEDICATED 26 CC SOLUTION HI LT ORNG CHLORAPREP

## (undated) DEVICE — PAD PT POS 36 IN SURGYPAD DISP

## (undated) DEVICE — SYSTEM SMK EVAC LAP TBNG FILTER HSNG BENT STYL PNK SEE CLR

## (undated) DEVICE — BINDER ABD UNIV H9IN WAIST 45-62IN E SFT COT PREM 3 PNL

## (undated) DEVICE — STERILE POLYISOPRENE POWDER-FREE SURGICAL GLOVES: Brand: PROTEXIS

## (undated) DEVICE — BLADELESS OBTURATOR, LONG: Brand: WECK VISTA

## (undated) DEVICE — SUTURE ABSORBABLE MONOFILAMENT 2-0 SH 6 IN STRATAFIX SPRL SXPP1B415

## (undated) DEVICE — CANNULA SEAL

## (undated) DEVICE — SEAL

## (undated) DEVICE — [HIGH FLOW HEATED INSUFFLATOR TUBING,  DO NOT USE IF PACKAGE IS DAMAGED]

## (undated) DEVICE — Device

## (undated) DEVICE — SYRINGE MED 10ML TRNSLUC BRL PLUNG BLK MRK POLYPR CTRL

## (undated) DEVICE — GLOVE ORANGE PI 7   MSG9070

## (undated) DEVICE — DEVICE CLSR 10/12MM XL PRT SYS SUT PASS ST DISP CARTER

## (undated) DEVICE — BINDER ABD 4 PANEL LG 12 IN 46-62 IN UNISX LINING ELASTIC

## (undated) DEVICE — SYRINGE MED 10ML SLIP TIP BLNT FILL AND LUERLOCK DISP

## (undated) DEVICE — REDUCER: Brand: ENDOWRIST

## (undated) DEVICE — STAPLER 60 RELOAD GREEN: Brand: SUREFORM

## (undated) DEVICE — TOWEL,STOP FLAG GOLD N-W: Brand: MEDLINE

## (undated) DEVICE — LARGE HEM-O-LOK CLIP APPLIER: Brand: ENDOWRIST

## (undated) DEVICE — TROCAR: Brand: KII OPTICAL ACCESS SYSTEM

## (undated) DEVICE — TIP COVER ACCESSORY

## (undated) DEVICE — SPONGE,LAP,4"X18",XR,ST,5/PK,40PK/CS: Brand: MEDLINE INDUSTRIES, INC.

## (undated) DEVICE — ROBOTIC: Brand: MEDLINE INDUSTRIES, INC.

## (undated) DEVICE — GLOVE SURG SZ 75 L12IN FNGR THK87MIL DK GRN LTX FREE ISOLEX

## (undated) DEVICE — MERCY FAIRFIELD TURNOVER KIT: Brand: MEDLINE INDUSTRIES, INC.

## (undated) DEVICE — ROBOTIC PK

## (undated) DEVICE — OBTURATOR ROBOTIC DIA8MM LNG BLDELSS ENDOSCP DISP DA VINCI

## (undated) DEVICE — 3M™ TEGADERM™ TRANSPARENT FILM DRESSING FRAME STYLE, 1628, 6 IN X 8 IN (15 CM X 20 CM), 10/CT 8CT/CASE: Brand: 3M™ TEGADERM™

## (undated) DEVICE — STAPLER 60: Brand: SUREFORM

## (undated) DEVICE — SYRINGE, LUER LOCK, 10ML: Brand: MEDLINE

## (undated) DEVICE — VISIGI 3D®  CALIBRATION SYSTEM  SIZE 36FR STD W/ BULB: Brand: BOEHRINGER® VISIGI 3D™ SLEEVE GASTRECTOMY CALIBRATION SYSTEM, SIZE 36FR W/BULB

## (undated) DEVICE — NEEDLE HYPO 22GA L1.5IN BLK POLYPR HUB S STL REG BVL STR

## (undated) DEVICE — 40583 XL ADVANCED TRENDELENBURG POSITIONING KIT: Brand: 40583 XL ADVANCED TRENDELENBURG POSITIONING KIT

## (undated) DEVICE — ADHESIVE SKIN CLSR 0.7ML TOP DERMBND ADV

## (undated) DEVICE — TISSUE RETRIEVAL SYSTEM: Brand: INZII RETRIEVAL SYSTEM

## (undated) DEVICE — NEEDLE,22GX1.5",REG,BEVEL: Brand: MEDLINE

## (undated) DEVICE — PERMANENT CAUTERY HOOK: Brand: ENDOWRIST

## (undated) DEVICE — Z DISC USE 2764362 SEAL ENDOSCP INSTR DIA5-8MM UNIV FOR CANN DA VINCI XI

## (undated) DEVICE — FORCEPS BX L240CM JAW DIA2.8MM L CAP W/ NDL MIC MESH TOOTH

## (undated) DEVICE — PUMP SUC IRR TBNG L10FT W/ HNDPC ASSEMB STRYKEFLOW 2

## (undated) DEVICE — FENESTRATED BIPOLAR FORCEPS: Brand: ENDOWRIST

## (undated) DEVICE — BAG SPEC REM 224ML W4XL6IN DIA10MM 1 HND GYN DISP ENDOPCH

## (undated) DEVICE — DRAPE SURG UTIL 26X15 IN W/ TAPE N INVASIVE MULT LAYR DISP

## (undated) DEVICE — INSUFFLATION NEEDLE TO ESTABLISH PNEUMOPERITONEUM.: Brand: INSUFFLATION NEEDLE

## (undated) DEVICE — STAPLER 60 RELOAD BLUE: Brand: SUREFORM

## (undated) DEVICE — MASK CAPNOGRAPHY AD W35IN DIA58IN SAMP LN L10FT O2 LN

## (undated) DEVICE — SUTURE VCRL  SZ 4-0 L18IN ABSRB UD PS-2 L19MM PRIM REV CUT VCP496G

## (undated) DEVICE — LAPAROSCOPIC SCISSORS: Brand: EPIX LAPAROSCOPIC SCISSORS

## (undated) DEVICE — BLANKET WRM W29.9XL79.1IN UP BODY FORC AIR MISTRAL-AIR

## (undated) DEVICE — COVER LT HNDL BLU PLAS

## (undated) DEVICE — PROGRASP FORCEPS: Brand: ENDOWRIST

## (undated) DEVICE — LIQUIBAND RAPID ADHESIVE 36/CS 0.8ML: Brand: MEDLINE

## (undated) DEVICE — SUTURE ETHBND EXCEL SZ 0 L30IN NONABSORBABLE GRN L26MM SH X834H

## (undated) DEVICE — SUTURE CTD ANTBCTRL 54 IN TI VCRL PLU VLT

## (undated) DEVICE — VESSEL SEALER EXTEND: Brand: ENDOWRIST

## (undated) DEVICE — SOLUTION ANTIFOG VIS SYS CLEARIFY LAPSCP

## (undated) DEVICE — GLOVE SURG SZ 75 L12IN THK75MIL DK GRN LTX FREE

## (undated) DEVICE — SUTURE VCRL SZ 4-0 L18IN ABSRB UD L19MM PS-2 3/8 CIR PRIM J496H

## (undated) DEVICE — 30977 SEE SHARP - ENHANCED INTRAOPERATIVE LAPAROSCOPE CLEANING & DEFOGGING: Brand: 30977 SEE SHARP - ENHANCED INTRAOPERATIVE LAPAROSCOPE CLEANING & DEFOGGING

## (undated) DEVICE — PENCIL ES L3M BTTN SWCH S STL HEX LOK BLDE ELECTRD HOLSTER